# Patient Record
Sex: MALE | Race: BLACK OR AFRICAN AMERICAN | NOT HISPANIC OR LATINO | Employment: FULL TIME | URBAN - METROPOLITAN AREA
[De-identification: names, ages, dates, MRNs, and addresses within clinical notes are randomized per-mention and may not be internally consistent; named-entity substitution may affect disease eponyms.]

---

## 2017-11-29 ENCOUNTER — HOSPITAL ENCOUNTER (EMERGENCY)
Facility: HOSPITAL | Age: 32
Discharge: HOME | End: 2017-11-29
Attending: EMERGENCY MEDICINE

## 2017-11-29 VITALS
DIASTOLIC BLOOD PRESSURE: 68 MMHG | OXYGEN SATURATION: 99 % | HEIGHT: 73 IN | SYSTOLIC BLOOD PRESSURE: 134 MMHG | BODY MASS INDEX: 21.87 KG/M2 | WEIGHT: 165 LBS | HEART RATE: 95 BPM | RESPIRATION RATE: 20 BRPM | TEMPERATURE: 98 F

## 2017-11-29 DIAGNOSIS — K64.5 THROMBOSED EXTERNAL HEMORRHOIDS: Primary | ICD-10-CM

## 2017-11-29 PROCEDURE — 99283 EMERGENCY DEPT VISIT LOW MDM: CPT

## 2017-11-29 RX ORDER — HYDROCORTISONE ACETATE PRAMOXINE HCL 2.5; 1 G/100G; G/100G
CREAM TOPICAL 3 TIMES DAILY
Qty: 28.35 G | Refills: 0 | Status: SHIPPED | OUTPATIENT
Start: 2017-11-29 | End: 2019-06-18

## 2017-11-29 RX ORDER — HYDROCODONE BITARTRATE AND ACETAMINOPHEN 5; 325 MG/1; MG/1
1 TABLET ORAL EVERY 6 HOURS PRN
Qty: 12 TABLET | Refills: 0 | Status: SHIPPED | OUTPATIENT
Start: 2017-11-29 | End: 2017-12-02

## 2017-11-30 ENCOUNTER — HOSPITAL ENCOUNTER (EMERGENCY)
Facility: OTHER | Age: 32
Discharge: HOME | End: 2017-11-30
Attending: EMERGENCY MEDICINE

## 2017-11-30 VITALS
WEIGHT: 165 LBS | HEIGHT: 73 IN | OXYGEN SATURATION: 99 % | BODY MASS INDEX: 21.87 KG/M2 | TEMPERATURE: 99 F | HEART RATE: 88 BPM | RESPIRATION RATE: 18 BRPM | DIASTOLIC BLOOD PRESSURE: 84 MMHG | SYSTOLIC BLOOD PRESSURE: 129 MMHG

## 2017-11-30 DIAGNOSIS — K64.9 HEMORRHOIDS, UNSPECIFIED HEMORRHOID TYPE: Primary | ICD-10-CM

## 2017-11-30 PROCEDURE — 25000003 PHARM REV CODE 250: Performed by: EMERGENCY MEDICINE

## 2017-11-30 PROCEDURE — 99283 EMERGENCY DEPT VISIT LOW MDM: CPT

## 2017-11-30 RX ORDER — OXYCODONE AND ACETAMINOPHEN 5; 325 MG/1; MG/1
1 TABLET ORAL
Status: COMPLETED | OUTPATIENT
Start: 2017-11-30 | End: 2017-11-30

## 2017-11-30 RX ADMIN — OXYCODONE HYDROCHLORIDE AND ACETAMINOPHEN 1 TABLET: 5; 325 TABLET ORAL at 06:11

## 2017-11-30 NOTE — DISCHARGE INSTRUCTIONS
Return to the Emergency department for any worsening or failure to improve, otherwise follow up with your primary care provider.  Warm sitz baths, high fiber diet, miralax over the counter to keep stools soft.

## 2017-11-30 NOTE — ED PROVIDER NOTES
"Encounter Date: 11/30/2017          History     Chief Complaint   Patient presents with    Rectal Pain     " I have hemorrhoids real bad" x 3 days. + bright red blood, pt states he is unable to sit down in triage due to increased pain.                  Review of patient's allergies indicates:  No Known Allergies  No past medical history on file.  No past surgical history on file.  No family history on file.  Social History   Substance Use Topics    Smoking status: Current Every Day Smoker     Types: Cigarettes    Smokeless tobacco: Never Used    Alcohol use Yes      Comment: social     Review of Systems       Physical Exam     Initial Vitals [11/30/17 1722]   BP Pulse Resp Temp SpO2   130/72 90 16 98.6 °F (37 °C) 96 %      MAP       91.33         Physical Exam          ED Course   Procedures  Labs Reviewed - No data to display                            ED Course      Clinical Impression:   No diagnosis found.                          "

## 2017-11-30 NOTE — ED TRIAGE NOTES
"Pt presents with c/o hemorrhoids and states that they started last night Pt with history of hemorrhoids..  Pt c/o inability to void states that it took him "a few hours" to void.  Denies any burning or pain with urination.  Pt rates pain as 10.    "

## 2017-11-30 NOTE — ED PROVIDER NOTES
"Encounter Date: 11/29/2017       History     Chief Complaint   Patient presents with    Rectal Pain     pt states "I think my hemorrhoids came back"; pt reports pain "in between buttcheeks" and feels like something is there starting last night; pt reports hx of hemorrhoids but states that it had gone away;     Chief complaint: Rectal pain    History of present illness: Patient is a 32-year-old male who presents for emergent consideration of rectal pain is been present for 1 day.  He reports hot bath relieve the pain somewhat.  He reports a history of hemorrhoids.  Current severity is 10 over 10.      The history is provided by the patient and the spouse. No  was used.     Review of patient's allergies indicates:  No Known Allergies  History reviewed. No pertinent past medical history.  History reviewed. No pertinent surgical history.  History reviewed. No pertinent family history.  Social History   Substance Use Topics    Smoking status: Current Every Day Smoker     Types: Cigarettes    Smokeless tobacco: Never Used    Alcohol use Yes      Comment: social     Review of Systems   Constitutional: Negative for appetite change, chills, diaphoresis, fatigue and fever.   HENT: Negative for congestion, ear discharge, ear pain, postnasal drip, rhinorrhea, sinus pressure, sneezing, sore throat and voice change.    Eyes: Negative for discharge, itching and visual disturbance.   Respiratory: Negative for cough, shortness of breath and wheezing.    Cardiovascular: Negative for chest pain, palpitations and leg swelling.   Gastrointestinal: Positive for rectal pain. Negative for abdominal pain, nausea and vomiting.   Endocrine: Negative for polydipsia, polyphagia and polyuria.   Genitourinary: Negative for difficulty urinating, discharge, dysuria, frequency, hematuria, penile pain, penile swelling and urgency.   Musculoskeletal: Negative for arthralgias and myalgias.   Skin: Negative for rash and wound. "   Neurological: Negative for dizziness, seizures, syncope and weakness.   Hematological: Negative for adenopathy. Does not bruise/bleed easily.   Psychiatric/Behavioral: Negative for agitation and self-injury. The patient is not nervous/anxious.        Physical Exam     Initial Vitals [11/29/17 2122]   BP Pulse Resp Temp SpO2   134/68 95 20 98.1 °F (36.7 °C) 99 %      MAP       90         Physical Exam    Nursing note and vitals reviewed.  Constitutional: He appears well-developed and well-nourished. He is not diaphoretic. No distress.   HENT:   Head: Normocephalic and atraumatic.   Right Ear: External ear normal.   Left Ear: External ear normal.   Nose: Nose normal.   Eyes: Pupils are equal, round, and reactive to light. Right eye exhibits no discharge. Left eye exhibits no discharge. No scleral icterus.   Neck: Normal range of motion.   Pulmonary/Chest: No respiratory distress.   Abdominal: He exhibits no distension.   Genitourinary: Rectal exam shows external hemorrhoid (thrombosed) and tenderness.   Musculoskeletal: Normal range of motion.   Neurological: He is alert and oriented to person, place, and time.   Skin: Skin is dry. Capillary refill takes less than 2 seconds.         ED Course   Procedures  Labs Reviewed - No data to display          Medical Decision Making:   Initial Assessment:   Patient is a 32-year-old male who complains of one day of rectal pain that is constant he believes related to his history of hemorrhoids.    Differential Diagnosis:   Differential diagnosis includes thrombosed external hemorrhoids, prolapsed internal hemorrhoid, rectal prolapse, anal fissure.  Visual examination reveals thrombosed external hemorrhoids.  Other:   I have discussed this case with another health care provider.       <> Summary of the Discussion: Care of the patient discussed with Dr. Acosta who agreed with the assessment and plan.                       ED Course as of Nov 30 3659   Wed Nov 29, 2017   2321 BP:  134/68 [VC]   2247 Temp: 98.1 °F (36.7 °C) [VC]   2247 Pulse: 95 [VC]   2247 Resp: 20 [VC]   2247 Triage note reviewed.  VS normal.   SpO2: 99 % [VC]      ED Course User Index  [VC] Surendra Ambrocio DNP     Clinical Impression:   The encounter diagnosis was Thrombosed external hemorrhoids.    Disposition:   Disposition: Discharged  Condition: Stable  Patient discharged with instructions to practice frequent sitz baths in warm water, MiraLAX 17 g in water each day to keep stools soft, anal pain advanced cream was prescribed for pain relief.  Patient was scheduled to follow-up with [facility] today at 1:20 PM.                        Surendra Ambrocio DNP  11/30/17 0125

## 2017-11-30 NOTE — ED TRIAGE NOTES
Pt reports having an hemorrhoid. Reports being seen at [facility] and being prescribed some cream that they were unable to afford (Analpram-HC $100). Pt reports its protruding out more and is more painful. Reports he has been taking Milk of Mag to avoid constipation. Reports some blood with wiping but small amounts.

## 2017-12-01 NOTE — ED PROVIDER NOTES
"Encounter Date: 11/30/2017    SCRIBE #1 NOTE: I, Robina Ramirezos, am scribing for, and in the presence of, Dr. Mcgraw.       History     Chief Complaint   Patient presents with    Rectal Pain     " I have hemorrhoids real bad" x 3 days. + bright red blood, pt states he is unable to sit down in triage due to increased pain.      Time seen by provider: 6:09 PM    This is a 32 y.o. male who presents with complaint of rectal pain that began three days ago. He reports a history of hemorrhoids. Patient has been drinking milk and magnesium, and has taken hot baths with no relief. Patient came to the ED yesterday for similar symptoms, though did not  his prescription due to financial reasons, and missed his appointment with colorectal surgery. He reports diarrhea, but denies fever, chills, nausea, vomiting, abdominal pain, myalgias, or any urinary symptoms.      The history is provided by the patient.     Review of patient's allergies indicates:  No Known Allergies  No past medical history on file.  No past surgical history on file.  No family history on file.  Social History   Substance Use Topics    Smoking status: Current Every Day Smoker     Types: Cigarettes    Smokeless tobacco: Never Used    Alcohol use Yes      Comment: social     Review of Systems   Constitutional: Negative for chills and fever.   HENT: Negative for congestion and sore throat.    Eyes: Negative for photophobia and redness.   Respiratory: Negative for cough and shortness of breath.    Cardiovascular: Negative for chest pain.   Gastrointestinal: Positive for diarrhea and rectal pain. Negative for abdominal pain, nausea and vomiting.   Genitourinary: Negative for decreased urine volume, difficulty urinating, dysuria, enuresis, frequency, hematuria and urgency.   Musculoskeletal: Negative for back pain and myalgias.   Skin: Negative for rash.   Neurological: Negative for weakness, light-headedness and headaches.   Psychiatric/Behavioral: " Negative for confusion.       Physical Exam     Initial Vitals [11/30/17 1722]   BP Pulse Resp Temp SpO2   130/72 90 16 98.6 °F (37 °C) 96 %      MAP       91.33         Physical Exam    Nursing note and vitals reviewed.  Constitutional: He appears well-developed and well-nourished. He is not diaphoretic. No distress.   HENT:   Head: Normocephalic and atraumatic.   Mouth/Throat: Oropharynx is clear and moist.   Eyes: Conjunctivae and EOM are normal. Pupils are equal, round, and reactive to light.   Neck: Normal range of motion. Neck supple.   Cardiovascular: S1 normal and S2 normal.   Pulmonary/Chest: No respiratory distress.   Genitourinary:   Genitourinary Comments: Multiple hemorrhoids (0.5-1 cm). One appears thrombus. No evidence of cellulitis or necrosis.   Musculoskeletal: Normal range of motion. He exhibits no edema or tenderness.   No lower extremity edema.    Neurological: He is alert and oriented to person, place, and time.   Skin: Skin is warm and dry. Capillary refill takes less than 2 seconds. No rash noted. No pallor.         ED Course   Procedures  Labs Reviewed - No data to display                       Attending Attestation:           Physician Attestation for Scribe:  Physician Attestation Statement for Scribe #1: I, Dr. Soto, reviewed documentation, as scribed by Robina Helton in my presence, and it is both accurate and complete.         Attending ED Notes:   Urgent evaluation a 32-year-old male with rectal pain.  Patient is afebrile, nontoxic-appearing with stable vital signs.  Rectal examination is consistent with hemorrhoids.  The patient is extensively counseled on his diagnosis and treatment, discharged good condition and directed follow-up with rectal surgery in the next 24-48 hours.          ED Course      Clinical Impression:     1. Hemorrhoids, unspecified hemorrhoid type                                 Ayaan Soto MD  11/30/17 2132

## 2019-06-18 ENCOUNTER — HOSPITAL ENCOUNTER (EMERGENCY)
Facility: HOSPITAL | Age: 34
Discharge: HOME | End: 2019-06-18
Attending: EMERGENCY MEDICINE

## 2019-06-18 VITALS
WEIGHT: 165 LBS | TEMPERATURE: 98 F | BODY MASS INDEX: 21.87 KG/M2 | DIASTOLIC BLOOD PRESSURE: 77 MMHG | SYSTOLIC BLOOD PRESSURE: 107 MMHG | HEIGHT: 73 IN | HEART RATE: 56 BPM | OXYGEN SATURATION: 96 % | RESPIRATION RATE: 18 BRPM

## 2019-06-18 DIAGNOSIS — M54.9 BACK PAIN, UNSPECIFIED BACK LOCATION, UNSPECIFIED BACK PAIN LATERALITY, UNSPECIFIED CHRONICITY: Primary | ICD-10-CM

## 2019-06-18 PROCEDURE — 96372 THER/PROPH/DIAG INJ SC/IM: CPT

## 2019-06-18 PROCEDURE — 25000003 PHARM REV CODE 250: Performed by: PHYSICIAN ASSISTANT

## 2019-06-18 PROCEDURE — 63600175 PHARM REV CODE 636 W HCPCS: Performed by: PHYSICIAN ASSISTANT

## 2019-06-18 PROCEDURE — 99284 EMERGENCY DEPT VISIT MOD MDM: CPT | Mod: 25

## 2019-06-18 RX ORDER — METHOCARBAMOL 500 MG/1
1500 TABLET, FILM COATED ORAL
Status: COMPLETED | OUTPATIENT
Start: 2019-06-18 | End: 2019-06-18

## 2019-06-18 RX ORDER — KETOROLAC TROMETHAMINE 30 MG/ML
30 INJECTION, SOLUTION INTRAMUSCULAR; INTRAVENOUS
Status: COMPLETED | OUTPATIENT
Start: 2019-06-18 | End: 2019-06-18

## 2019-06-18 RX ORDER — METHOCARBAMOL 500 MG/1
1500 TABLET, FILM COATED ORAL 3 TIMES DAILY
Qty: 45 TABLET | Refills: 0 | Status: SHIPPED | OUTPATIENT
Start: 2019-06-18 | End: 2019-06-23

## 2019-06-18 RX ORDER — ACETAMINOPHEN 500 MG
500 TABLET ORAL EVERY 6 HOURS PRN
Status: ON HOLD | COMMUNITY
End: 2023-03-28 | Stop reason: HOSPADM

## 2019-06-18 RX ORDER — HYDROCODONE BITARTRATE AND ACETAMINOPHEN 5; 325 MG/1; MG/1
1 TABLET ORAL EVERY 4 HOURS PRN
Qty: 12 TABLET | Refills: 0 | Status: ON HOLD | OUTPATIENT
Start: 2019-06-18 | End: 2023-03-28 | Stop reason: HOSPADM

## 2019-06-18 RX ADMIN — KETOROLAC TROMETHAMINE 30 MG: 30 INJECTION, SOLUTION INTRAMUSCULAR at 11:06

## 2019-06-18 RX ADMIN — METHOCARBAMOL 1500 MG: 500 TABLET, FILM COATED ORAL at 11:06
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
## 2019-06-18 NOTE — DISCHARGE INSTRUCTIONS
Take medication As prescribed.  Place ice or heat to the areas for pain relief.  Do not drive or operate heavy machinery while taking pain medication as it could make a sleepy.  Return to the emergency department if you have any change or concerning symptoms. Please follow-up with your doctor.

## 2019-09-23 ENCOUNTER — OCCUPATIONAL HEALTH (OUTPATIENT)
Dept: URGENT CARE | Facility: CLINIC | Age: 34
End: 2019-09-23

## 2019-09-23 PROCEDURE — 82075 ASSAY OF BREATH ETHANOL: CPT | Mod: S$GLB,,, | Performed by: EMERGENCY MEDICINE

## 2019-09-23 PROCEDURE — 82075 CHG ASSAY OF BREATH ETHANOL: ICD-10-PCS | Mod: S$GLB,,, | Performed by: EMERGENCY MEDICINE

## 2019-09-23 PROCEDURE — 80305 DRUG TEST PRSMV DIR OPT OBS: CPT | Mod: S$GLB,,, | Performed by: EMERGENCY MEDICINE

## 2019-09-23 PROCEDURE — 80305 PR COLLECTION ONLY DRUG SCREEN: ICD-10-PCS | Mod: S$GLB,,, | Performed by: EMERGENCY MEDICINE

## 2021-12-31 ENCOUNTER — HOSPITAL ENCOUNTER (EMERGENCY)
Facility: HOSPITAL | Age: 36
Discharge: HOME | End: 2021-12-31
Attending: EMERGENCY MEDICINE
Payer: OTHER GOVERNMENT

## 2021-12-31 VITALS
WEIGHT: 170 LBS | OXYGEN SATURATION: 99 % | DIASTOLIC BLOOD PRESSURE: 76 MMHG | RESPIRATION RATE: 16 BRPM | TEMPERATURE: 100 F | SYSTOLIC BLOOD PRESSURE: 121 MMHG | BODY MASS INDEX: 22.53 KG/M2 | HEIGHT: 73 IN | HEART RATE: 79 BPM

## 2021-12-31 DIAGNOSIS — U07.1 COVID-19: Primary | ICD-10-CM

## 2021-12-31 LAB — SARS-COV-2 RDRP RESP QL NAA+PROBE: POSITIVE

## 2021-12-31 PROCEDURE — U0002 COVID-19 LAB TEST NON-CDC: HCPCS | Performed by: EMERGENCY MEDICINE

## 2021-12-31 PROCEDURE — 99283 EMERGENCY DEPT VISIT LOW MDM: CPT

## 2021-12-31 PROCEDURE — 25000003 PHARM REV CODE 250: Performed by: EMERGENCY MEDICINE

## 2021-12-31 RX ORDER — ACETAMINOPHEN 500 MG
1000 TABLET ORAL
Status: COMPLETED | OUTPATIENT
Start: 2021-12-31 | End: 2021-12-31

## 2021-12-31 RX ADMIN — ACETAMINOPHEN 1000 MG: 500 TABLET, FILM COATED ORAL at 02:12

## 2021-12-31 NOTE — ED PROVIDER NOTES
Encounter Date: 2021       History     Chief Complaint   Patient presents with    COVID-19 Concerns     He also has a headache  for the past two days     Chief complaint mild throbbing headache and the sensation that he has fever.  He is concerned they have COVID was see test.  He has no complaints of chest pain nausea vomiting diarrhea trouble urinating or any other problems.        Review of patient's allergies indicates:  No Known Allergies  No past medical history on file.  No past surgical history on file.  No family history on file.  Social History     Tobacco Use    Smoking status: Former Smoker     Types: Cigarettes     Quit date: 2019     Years since quittin.7    Smokeless tobacco: Never Used   Substance Use Topics    Alcohol use: Yes     Comment: socially    Drug use: No     Review of Systems   Constitutional: Positive for fever. Negative for chills.   HENT: Negative for ear pain, rhinorrhea and sore throat.    Eyes: Negative for pain and visual disturbance.   Respiratory: Negative for cough and shortness of breath.    Cardiovascular: Negative for chest pain and palpitations.   Gastrointestinal: Negative for abdominal pain, constipation, diarrhea, nausea and vomiting.   Genitourinary: Negative for dysuria, frequency, hematuria and urgency.   Musculoskeletal: Negative for back pain, joint swelling and myalgias.   Skin: Negative for rash.   Neurological: Positive for headaches. Negative for dizziness, seizures and weakness.   Psychiatric/Behavioral: Negative for dysphoric mood. The patient is not nervous/anxious.        Physical Exam     Initial Vitals [21 0242]   BP Pulse Resp Temp SpO2   121/76 79 16 99.9 °F (37.7 °C) 99 %      MAP       --         Physical Exam    Nursing note and vitals reviewed.  Constitutional: He appears well-developed and well-nourished.   HENT:   Head: Normocephalic and atraumatic.   Eyes: Conjunctivae, EOM and lids are normal. Pupils are equal, round, and  reactive to light.   Neck: Trachea normal. Neck supple. No thyroid mass present.   Cardiovascular: Normal rate, regular rhythm and normal heart sounds.   Pulmonary/Chest: Breath sounds normal. No respiratory distress.   Abdominal: Abdomen is soft. There is no abdominal tenderness.   Musculoskeletal:         General: Normal range of motion.      Cervical back: Neck supple.     Neurological: He is alert and oriented to person, place, and time. He has normal strength and normal reflexes. No cranial nerve deficit or sensory deficit.   Skin: Skin is warm and dry.   Psychiatric: He has a normal mood and affect. His speech is normal and behavior is normal. Judgment and thought content normal.         ED Course   Procedures  Labs Reviewed   SARS-COV-2 RNA AMPLIFICATION, QUAL - Abnormal; Notable for the following components:       Result Value    SARS-CoV-2 RNA, Amplification, Qual Positive (*)     All other components within normal limits          Imaging Results    None          Medications   acetaminophen tablet 1,000 mg (1,000 mg Oral Given 12/31/21 0250)     Medical Decision Making:   ED Management:  The patient has positive COVID will give instructions                      Clinical Impression:   Final diagnoses:  [U07.1] COVID-19 (Primary)          ED Disposition Condition    Discharge Stable        ED Prescriptions     None        Follow-up Information    None          Shanice Madrigal MD  12/31/21 9022

## 2021-12-31 NOTE — DISCHARGE INSTRUCTIONS
Buy a pulse ox device and if your oxygen is 90% or less come to the ER for evaluation.  Drink plenty fluids take your vitamins is zinc return as needed

## 2022-12-08 ENCOUNTER — HOSPITAL ENCOUNTER (EMERGENCY)
Facility: HOSPITAL | Age: 37
Discharge: HOME | End: 2022-12-08
Attending: EMERGENCY MEDICINE
Payer: COMMERCIAL

## 2022-12-08 VITALS
BODY MASS INDEX: 21.87 KG/M2 | SYSTOLIC BLOOD PRESSURE: 110 MMHG | DIASTOLIC BLOOD PRESSURE: 70 MMHG | WEIGHT: 165 LBS | HEART RATE: 78 BPM | HEIGHT: 73 IN | RESPIRATION RATE: 18 BRPM | TEMPERATURE: 98 F | OXYGEN SATURATION: 99 %

## 2022-12-08 DIAGNOSIS — M62.838 MUSCLE SPASM: Primary | ICD-10-CM

## 2022-12-08 PROCEDURE — 99283 EMERGENCY DEPT VISIT LOW MDM: CPT

## 2022-12-08 PROCEDURE — 25000003 PHARM REV CODE 250: Performed by: STUDENT IN AN ORGANIZED HEALTH CARE EDUCATION/TRAINING PROGRAM

## 2022-12-08 RX ORDER — LIDOCAINE 50 MG/G
1 PATCH TOPICAL ONCE
Status: DISCONTINUED | OUTPATIENT
Start: 2022-12-08 | End: 2022-12-08 | Stop reason: HOSPADM

## 2022-12-08 RX ORDER — LIDOCAINE 560 MG/1
1 PATCH PERCUTANEOUS; TOPICAL; TRANSDERMAL EVERY 12 HOURS
Qty: 10 PATCH | Refills: 0 | Status: SHIPPED | OUTPATIENT
Start: 2022-12-08 | End: 2022-12-08 | Stop reason: SDUPTHER

## 2022-12-08 RX ORDER — BUTALBITAL, ACETAMINOPHEN AND CAFFEINE 50; 325; 40 MG/1; MG/1; MG/1
1 TABLET ORAL ONCE
Status: DISCONTINUED | OUTPATIENT
Start: 2022-12-08 | End: 2022-12-08

## 2022-12-08 RX ORDER — METHOCARBAMOL 500 MG/1
1000 TABLET, FILM COATED ORAL 3 TIMES DAILY
Qty: 30 TABLET | Refills: 0 | Status: SHIPPED | OUTPATIENT
Start: 2022-12-08 | End: 2022-12-13

## 2022-12-08 RX ORDER — ACETAMINOPHEN 500 MG
1000 TABLET ORAL
Status: COMPLETED | OUTPATIENT
Start: 2022-12-08 | End: 2022-12-08

## 2022-12-08 RX ORDER — BUTALBITAL, ACETAMINOPHEN AND CAFFEINE 50; 325; 40 MG/1; MG/1; MG/1
1 TABLET ORAL EVERY 4 HOURS PRN
Status: DISCONTINUED | OUTPATIENT
Start: 2022-12-08 | End: 2022-12-08

## 2022-12-08 RX ORDER — LIDOCAINE 560 MG/1
1 PATCH PERCUTANEOUS; TOPICAL; TRANSDERMAL EVERY 12 HOURS
Qty: 10 PATCH | Refills: 0 | Status: ON HOLD | OUTPATIENT
Start: 2022-12-08 | End: 2023-03-28 | Stop reason: HOSPADM

## 2022-12-08 RX ORDER — METHOCARBAMOL 500 MG/1
500 TABLET, FILM COATED ORAL
Status: COMPLETED | OUTPATIENT
Start: 2022-12-08 | End: 2022-12-08

## 2022-12-08 RX ORDER — NAPROXEN 500 MG/1
500 TABLET ORAL 2 TIMES DAILY WITH MEALS
Qty: 14 TABLET | Refills: 0 | Status: ON HOLD | OUTPATIENT
Start: 2022-12-08 | End: 2023-03-28 | Stop reason: HOSPADM

## 2022-12-08 RX ADMIN — LIDOCAINE 1 PATCH: 50 PATCH TOPICAL at 06:12

## 2022-12-08 RX ADMIN — ACETAMINOPHEN 1000 MG: 500 TABLET ORAL at 06:12

## 2022-12-08 RX ADMIN — METHOCARBAMOL 500 MG: 500 TABLET ORAL at 06:12

## 2022-12-08 NOTE — ED NOTES
Patient identifiers for Brennan Shah checked and correct.  LOC:  Brennan Shah is awake, alert, and aware of environment with an appropriate affect. He is oriented x 3 and speaking appropriately.  APPEARANCE:  He is resting comfortably and in no acute distress. He is clean and well groomed, patient's clothing is properly fastened.  SKIN:  The skin is warm and dry. He has normal skin turgor and moist mucus membranes. Skin is intact; no bruising or breakdown noted.  MUSCULOSKELETAL:  He is moving all extremities well, no obvious deformities noted. Pulses intact.   RESPIRATORY:  Airway is open and patent. Respirations are spontaneous and non-labored with normal effort and rate.  CARDIAC:  He has a normal rate and rhythm. No peripheral edema noted. Capillary refill < 3 seconds.  ABDOMEN:  No distention noted.  Soft and non-tender upon palpation.  NEUROLOGICAL:  PERRL. Facial expression is symmetrical. Hand grasps are equal bilaterally. Normal sensation in all extremities when touched with finger.  Allergies reported:  Review of patient's allergies indicates:  No Known Allergies  OTHER NOTES:

## 2022-12-08 NOTE — ED PROVIDER NOTES
Encounter Date: 12/8/2022       History     Chief Complaint   Patient presents with    Headache    Shoulder Pain     Pain radiating down Rt shoulder/arm  Denies trauma     HPI    36 yo M with no sig pmh presenting with headache and R neck and shoulder pain for 2 days. Pt denies inciting injury. 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 Endorses pain with movement. The shoulder and arm pain started first, and radiated up to the R forehead. Endorses 9/10 headache. He tried a massage machine yesterday, heat packs, topical anesthetics. He tried aleve 1 hr PTA. No weakness, numbness, no neck stiffness.      Review of patient's allergies indicates:  No Known Allergies  No past medical history on file.  No past surgical history on file.  No family history on file.  Social History     Tobacco Use    Smoking status: Former     Types: Cigarettes     Quit date: 04/2019     Years since quitting: 3.6    Smokeless tobacco: Never   Substance Use Topics    Alcohol use: Yes     Comment: socially    Drug use: No     Review of Systems   Constitutional:  Negative for fatigue and fever.   HENT:  Negative for congestion.    Eyes:  Negative for redness.   Respiratory:  Negative for cough and shortness of breath.    Cardiovascular:  Negative for chest pain.   Gastrointestinal:  Negative for nausea and vomiting.   Endocrine: Negative for polyuria.   Genitourinary:  Negative for difficulty urinating and dysuria.   Musculoskeletal:  Positive for myalgias and neck pain. Negative for back pain and neck stiffness.   Skin:  Negative for rash and wound.   Allergic/Immunologic: Negative for immunocompromised state.   Neurological:  Positive for headaches. Negative for weakness.   Hematological:  Does not bruise/bleed easily.     Physical Exam     Initial Vitals [12/08/22 0454]   BP Pulse Resp Temp SpO2   (!) 105/58 (!) 59 15 98.4 °F (36.9 °C) 97 %      MAP       --         Physical Exam    Nursing note and vitals  reviewed.  Constitutional: He appears well-developed and well-nourished.   HENT:   Head: Normocephalic and atraumatic.   Eyes: EOM are normal. Pupils are equal, round, and reactive to light.   Neck:   Normal range of motion.  Cardiovascular:  Normal rate and regular rhythm.           Pulmonary/Chest: Breath sounds normal.   Abdominal: Abdomen is soft. Bowel sounds are normal.   Musculoskeletal:         General: Normal range of motion.      Cervical back: Normal range of motion.     Neurological: He is alert.   CN II-XII intact, clear speech, strength 5/5 in UE and LE, normal sensation to UE and LE, no nystagmus, pupils 4 mm and PERRL, negative FTN, and negative pronator drift.   Skin: Skin is warm. Capillary refill takes less than 2 seconds.   Psychiatric: He has a normal mood and affect.       ED Course   Procedures  Labs Reviewed - No data to display       Imaging Results    None          Medications   LIDOcaine 5 % patch 1 patch (1 patch Transdermal Patch Applied 12/8/22 0617)   methocarbamoL tablet 500 mg (500 mg Oral Given 12/8/22 0616)   acetaminophen tablet 1,000 mg (1,000 mg Oral Given 12/8/22 0623)                       38 yo M with no sig pmh presenting with headache and R neck and shoulder pain for 2 days. Ddx includes torticollis, muscle spasm, trigeminal neuralgia, tension headache, muscle strain, radiculopathy. VS grossly normal. Denies red flag sx. No imaging needed. Ordered lidocaine patch, robaxin and fioricet for headache. If pain improves, pt will be clear for discharge for continued rx for robaxin and lidocaine patches.     Gabby Sullivan MD PGY5  [facility]  6:10 AM 12/08/2022               Clinical Impression:   Final diagnoses:  [M62.838] Muscle spasm (Primary)      ED Disposition Condition    Discharge Stable          ED Prescriptions       Medication Sig Dispense Start Date End Date Auth. Provider    methocarbamoL (ROBAXIN) 500 MG Tab Take 2 tablets (1,000 mg total)  by mouth 3 (three) times daily. for 5 days 30 tablet 12/8/2022 12/13/2022 Gabby Millan MD    LIDOcaine 4 % PtMd Apply 1 patch topically every 12 (twelve) hours. 10 patch 12/8/2022 -- Gabby Millan MD    naproxen (NAPROSYN) 500 MG tablet Take 1 tablet (500 mg total) by mouth 2 (two) times daily with meals. 14 tablet 12/8/2022 -- Gabby Millan MD          Follow-up Information       Follow up With Specialties Details Why Contact Info Additional Information    [facility] - Emergency Dept Emergency Medicine Go to  If symptoms worsen [address] [facility] [address]  [phone] [address] [place] [address]  Schedule an appointment as soon as possible for a visit in 3 days For ED follow up [address]  [facility] [address]  [phone]                Gabby Millan MD  Resident  12/08/22 0624

## 2022-12-08 NOTE — Clinical Note
"Brennan Shah (Byron) was seen and treated in our emergency department on 12/8/2022.  He may return to work on 12/09/2022.       If you have any questions or concerns, please don't hesitate to call.      Gabby Millan MD"

## 2023-03-24 ENCOUNTER — HOSPITAL ENCOUNTER (INPATIENT)
Facility: HOSPITAL | Age: 38
LOS: 4 days | Discharge: HOME | DRG: 060 | End: 2023-03-28
Attending: EMERGENCY MEDICINE | Admitting: HOSPITALIST
Payer: COMMERCIAL

## 2023-03-24 DIAGNOSIS — I63.9 STROKE: ICD-10-CM

## 2023-03-24 DIAGNOSIS — I63.81 RIGHT-SIDED LACUNAR INFARCTION: ICD-10-CM

## 2023-03-24 DIAGNOSIS — I63.9 CVA (CEREBRAL VASCULAR ACCIDENT): ICD-10-CM

## 2023-03-24 DIAGNOSIS — R53.1 ACUTE LEFT-SIDED WEAKNESS: ICD-10-CM

## 2023-03-24 DIAGNOSIS — G35 MULTIPLE SCLEROSIS WITH ACUTE NEUROLOGIC EVENT: Primary | ICD-10-CM

## 2023-03-24 PROBLEM — F12.90 MARIJUANA SMOKER: Status: ACTIVE | Noted: 2023-03-24

## 2023-03-24 PROBLEM — G81.90 HEMIPARESIS: Status: ACTIVE | Noted: 2023-03-24

## 2023-03-24 LAB
ALBUMIN SERPL BCP-MCNC: 4.5 G/DL (ref 3.5–5.2)
ALP SERPL-CCNC: 50 U/L (ref 55–135)
ALT SERPL W/O P-5'-P-CCNC: 13 U/L (ref 10–44)
ANION GAP SERPL CALC-SCNC: 8 MMOL/L (ref 8–16)
AST SERPL-CCNC: 19 U/L (ref 10–40)
BASOPHILS # BLD AUTO: 0.03 K/UL (ref 0–0.2)
BASOPHILS NFR BLD: 0.5 % (ref 0–1.9)
BILIRUB SERPL-MCNC: 0.6 MG/DL (ref 0.1–1)
BUN SERPL-MCNC: 9 MG/DL (ref 6–20)
CALCIUM SERPL-MCNC: 9.5 MG/DL (ref 8.7–10.5)
CHLORIDE SERPL-SCNC: 103 MMOL/L (ref 95–110)
CHOLEST SERPL-MCNC: 184 MG/DL (ref 120–199)
CHOLEST/HDLC SERPL: 3.3 {RATIO} (ref 2–5)
CO2 SERPL-SCNC: 29 MMOL/L (ref 23–29)
CREAT SERPL-MCNC: 1.1 MG/DL (ref 0.5–1.4)
DIFFERENTIAL METHOD: NORMAL
EOSINOPHIL # BLD AUTO: 0.3 K/UL (ref 0–0.5)
EOSINOPHIL NFR BLD: 4.1 % (ref 0–8)
ERYTHROCYTE [DISTWIDTH] IN BLOOD BY AUTOMATED COUNT: 12.7 % (ref 11.5–14.5)
EST. GFR  (NO RACE VARIABLE): >60 ML/MIN/1.73 M^2
GLUCOSE SERPL-MCNC: 101 MG/DL (ref 70–110)
HCT VFR BLD AUTO: 47.1 % (ref 40–54)
HDLC SERPL-MCNC: 55 MG/DL (ref 40–75)
HDLC SERPL: 29.9 % (ref 20–50)
HGB BLD-MCNC: 15.2 G/DL (ref 14–18)
IMM GRANULOCYTES # BLD AUTO: 0.02 K/UL (ref 0–0.04)
IMM GRANULOCYTES NFR BLD AUTO: 0.3 % (ref 0–0.5)
INR PPP: 1.1 (ref 0.8–1.2)
LDLC SERPL CALC-MCNC: 103.2 MG/DL (ref 63–159)
LYMPHOCYTES # BLD AUTO: 1.9 K/UL (ref 1–4.8)
LYMPHOCYTES NFR BLD: 28.2 % (ref 18–48)
MCH RBC QN AUTO: 30.2 PG (ref 27–31)
MCHC RBC AUTO-ENTMCNC: 32.3 G/DL (ref 32–36)
MCV RBC AUTO: 94 FL (ref 82–98)
MONOCYTES # BLD AUTO: 0.4 K/UL (ref 0.3–1)
MONOCYTES NFR BLD: 6.3 % (ref 4–15)
NEUTROPHILS # BLD AUTO: 4 K/UL (ref 1.8–7.7)
NEUTROPHILS NFR BLD: 60.6 % (ref 38–73)
NONHDLC SERPL-MCNC: 129 MG/DL
NRBC BLD-RTO: 0 /100 WBC
PLATELET # BLD AUTO: 199 K/UL (ref 150–450)
PMV BLD AUTO: 10.6 FL (ref 9.2–12.9)
POTASSIUM SERPL-SCNC: 4 MMOL/L (ref 3.5–5.1)
PROT SERPL-MCNC: 7.1 G/DL (ref 6–8.4)
PROTHROMBIN TIME: 11.9 SEC (ref 9–12.5)
RBC # BLD AUTO: 5.03 M/UL (ref 4.6–6.2)
SODIUM SERPL-SCNC: 140 MMOL/L (ref 136–145)
TRIGL SERPL-MCNC: 129 MG/DL (ref 30–150)
TSH SERPL DL<=0.005 MIU/L-ACNC: 0.78 UIU/ML (ref 0.34–5.6)
WBC # BLD AUTO: 6.64 K/UL (ref 3.9–12.7)

## 2023-03-24 PROCEDURE — 84443 ASSAY THYROID STIM HORMONE: CPT

## 2023-03-24 PROCEDURE — 93010 EKG 12-LEAD: ICD-10-PCS | Mod: ,,, | Performed by: SPECIALIST

## 2023-03-24 PROCEDURE — 85025 COMPLETE CBC W/AUTO DIFF WBC: CPT

## 2023-03-24 PROCEDURE — 80307 DRUG TEST PRSMV CHEM ANLYZR: CPT | Performed by: HOSPITALIST

## 2023-03-24 PROCEDURE — 80061 LIPID PANEL: CPT

## 2023-03-24 PROCEDURE — 81003 URINALYSIS AUTO W/O SCOPE: CPT | Performed by: HOSPITALIST

## 2023-03-24 PROCEDURE — 80053 COMPREHEN METABOLIC PANEL: CPT

## 2023-03-24 PROCEDURE — 99285 EMERGENCY DEPT VISIT HI MDM: CPT | Mod: 25

## 2023-03-24 PROCEDURE — 21400001 HC TELEMETRY ROOM

## 2023-03-24 PROCEDURE — 93005 ELECTROCARDIOGRAM TRACING: CPT | Performed by: SPECIALIST

## 2023-03-24 PROCEDURE — 85610 PROTHROMBIN TIME: CPT

## 2023-03-24 PROCEDURE — 93010 ELECTROCARDIOGRAM REPORT: CPT | Mod: ,,, | Performed by: SPECIALIST

## 2023-03-24 RX ORDER — SODIUM CHLORIDE 0.9 % (FLUSH) 0.9 %
10 SYRINGE (ML) INJECTION
Status: DISCONTINUED | OUTPATIENT
Start: 2023-03-25 | End: 2023-03-28 | Stop reason: HOSPADM

## 2023-03-24 RX ORDER — ASPIRIN 325 MG
325 TABLET ORAL
Status: COMPLETED | OUTPATIENT
Start: 2023-03-24 | End: 2023-03-25

## 2023-03-24 RX ORDER — ATORVASTATIN CALCIUM 40 MG/1
40 TABLET, FILM COATED ORAL NIGHTLY
Status: DISCONTINUED | OUTPATIENT
Start: 2023-03-25 | End: 2023-03-28 | Stop reason: HOSPADM

## 2023-03-24 RX ORDER — ASPIRIN 81 MG/1
81 TABLET ORAL DAILY
Status: DISCONTINUED | OUTPATIENT
Start: 2023-03-25 | End: 2023-03-27

## 2023-03-24 RX ORDER — LABETALOL HYDROCHLORIDE 5 MG/ML
10 INJECTION, SOLUTION INTRAVENOUS
Status: DISCONTINUED | OUTPATIENT
Start: 2023-03-25 | End: 2023-03-28 | Stop reason: HOSPADM

## 2023-03-24 RX ORDER — ONDANSETRON 2 MG/ML
4 INJECTION INTRAMUSCULAR; INTRAVENOUS EVERY 8 HOURS PRN
Status: DISCONTINUED | OUTPATIENT
Start: 2023-03-25 | End: 2023-03-28 | Stop reason: HOSPADM

## 2023-03-25 ENCOUNTER — CLINICAL SUPPORT (OUTPATIENT)
Dept: CARDIOLOGY | Facility: HOSPITAL | Age: 38
DRG: 060 | End: 2023-03-25
Attending: HOSPITALIST
Payer: COMMERCIAL

## 2023-03-25 VITALS — WEIGHT: 158 LBS | BODY MASS INDEX: 20.94 KG/M2 | HEIGHT: 73 IN

## 2023-03-25 LAB
ALBUMIN SERPL BCP-MCNC: 4 G/DL (ref 3.5–5.2)
ALP SERPL-CCNC: 44 U/L (ref 55–135)
ALT SERPL W/O P-5'-P-CCNC: 15 U/L (ref 10–44)
AMPHET+METHAMPHET UR QL: NEGATIVE
ANION GAP SERPL CALC-SCNC: 6 MMOL/L (ref 8–16)
AORTIC ROOT ANNULUS: 2.7 CM
AORTIC VALVE CUSP SEPERATION: 1.7 CM
AST SERPL-CCNC: 19 U/L (ref 10–40)
AV INDEX (PROSTH): 0.82
AV MEAN GRADIENT: 3 MMHG
AV PEAK GRADIENT: 6 MMHG
AV VALVE AREA: 2.59 CM2
AV VELOCITY RATIO: 0.85
BARBITURATES UR QL SCN>200 NG/ML: NEGATIVE
BASOPHILS # BLD AUTO: 0.02 K/UL (ref 0–0.2)
BASOPHILS NFR BLD: 0.3 % (ref 0–1.9)
BENZODIAZ UR QL SCN>200 NG/ML: NEGATIVE
BILIRUB SERPL-MCNC: 0.5 MG/DL (ref 0.1–1)
BILIRUB UR QL STRIP: NEGATIVE
BSA FOR ECHO PROCEDURE: 1.92 M2
BUN SERPL-MCNC: 12 MG/DL (ref 6–20)
BZE UR QL SCN: NEGATIVE
CALCIUM SERPL-MCNC: 9.1 MG/DL (ref 8.7–10.5)
CANNABINOIDS UR QL SCN: ABNORMAL
CHLORIDE SERPL-SCNC: 105 MMOL/L (ref 95–110)
CK MB SERPL-MCNC: 0.7 NG/ML (ref 0.1–6.5)
CLARITY UR: CLEAR
CO2 SERPL-SCNC: 26 MMOL/L (ref 23–29)
COLOR UR: YELLOW
CREAT SERPL-MCNC: 0.9 MG/DL (ref 0.5–1.4)
CREAT UR-MCNC: 240 MG/DL (ref 23–375)
CV ECHO LV RWT: 0.57 CM
DIFFERENTIAL METHOD: NORMAL
DOP CALC AO PEAK VEL: 1.18 M/S
DOP CALC AO VTI: 24.5 CM
DOP CALC LVOT AREA: 3.1 CM2
DOP CALC LVOT DIAMETER: 2 CM
DOP CALC LVOT PEAK VEL: 1 M/S
DOP CALC LVOT STROKE VOLUME: 63.43 CM3
DOP CALC MV VTI: 38.2 CM
DOP CALCLVOT PEAK VEL VTI: 20.2 CM
E WAVE DECELERATION TIME: 250 MSEC
E/A RATIO: 1.26
E/E' RATIO: 4.53 M/S
ECHO LV POSTERIOR WALL: 1.22 CM (ref 0.6–1.1)
EJECTION FRACTION: 69 %
EOSINOPHIL # BLD AUTO: 0.3 K/UL (ref 0–0.5)
EOSINOPHIL NFR BLD: 4.8 % (ref 0–8)
ERYTHROCYTE [DISTWIDTH] IN BLOOD BY AUTOMATED COUNT: 12.4 % (ref 11.5–14.5)
EST. GFR  (NO RACE VARIABLE): >60 ML/MIN/1.73 M^2
ESTIMATED AVG GLUCOSE: 120 MG/DL (ref 68–131)
FRACTIONAL SHORTENING: 39 % (ref 28–44)
GLUCOSE SERPL-MCNC: 108 MG/DL (ref 70–110)
GLUCOSE UR QL STRIP: NEGATIVE
HBA1C MFR BLD: 5.8 % (ref 4.5–6.2)
HCT VFR BLD AUTO: 44.6 % (ref 40–54)
HGB BLD-MCNC: 14.8 G/DL (ref 14–18)
HGB UR QL STRIP: NEGATIVE
IMM GRANULOCYTES # BLD AUTO: 0.02 K/UL (ref 0–0.04)
IMM GRANULOCYTES NFR BLD AUTO: 0.3 % (ref 0–0.5)
INTERVENTRICULAR SEPTUM: 0.82 CM (ref 0.6–1.1)
KETONES UR QL STRIP: NEGATIVE
LEFT INTERNAL DIMENSION IN SYSTOLE: 2.6 CM (ref 2.1–4)
LEFT VENTRICLE DIASTOLIC VOLUME INDEX: 41.9 ML/M2
LEFT VENTRICLE DIASTOLIC VOLUME: 81.7 ML
LEFT VENTRICLE MASS INDEX: 74 G/M2
LEFT VENTRICLE SYSTOLIC VOLUME INDEX: 12.6 ML/M2
LEFT VENTRICLE SYSTOLIC VOLUME: 24.6 ML
LEFT VENTRICULAR INTERNAL DIMENSION IN DIASTOLE: 4.27 CM (ref 3.5–6)
LEFT VENTRICULAR MASS: 144.86 G
LEUKOCYTE ESTERASE UR QL STRIP: NEGATIVE
LV LATERAL E/E' RATIO: 4.25 M/S
LV SEPTAL E/E' RATIO: 4.86 M/S
LVOT MG: 2 MMHG
LVOT MV: 0.64 CM/S
LYMPHOCYTES # BLD AUTO: 2 K/UL (ref 1–4.8)
LYMPHOCYTES NFR BLD: 28.2 % (ref 18–48)
MAGNESIUM SERPL-MCNC: 1.8 MG/DL (ref 1.6–2.6)
MCH RBC QN AUTO: 30.9 PG (ref 27–31)
MCHC RBC AUTO-ENTMCNC: 33.2 G/DL (ref 32–36)
MCV RBC AUTO: 93 FL (ref 82–98)
MONOCYTES # BLD AUTO: 0.4 K/UL (ref 0.3–1)
MONOCYTES NFR BLD: 5.8 % (ref 4–15)
MV MEAN GRADIENT: 2 MMHG
MV PEAK A VEL: 0.54 M/S
MV PEAK E VEL: 0.68 M/S
MV PEAK GRADIENT: 4 MMHG
MV STENOSIS PRESSURE HALF TIME: 141 MS
MV VALVE AREA BY CONTINUITY EQUATION: 1.66 CM2
MV VALVE AREA P 1/2 METHOD: 1.56 CM2
NEUTROPHILS # BLD AUTO: 4.3 K/UL (ref 1.8–7.7)
NEUTROPHILS NFR BLD: 60.6 % (ref 38–73)
NITRITE UR QL STRIP: NEGATIVE
NRBC BLD-RTO: 0 /100 WBC
OPIATES UR QL SCN: NEGATIVE
PCP UR QL SCN>25 NG/ML: NEGATIVE
PH UR STRIP: 8 [PH] (ref 5–8)
PHOSPHATE SERPL-MCNC: 3.8 MG/DL (ref 2.7–4.5)
PLATELET # BLD AUTO: 186 K/UL (ref 150–450)
PMV BLD AUTO: 10.8 FL (ref 9.2–12.9)
POTASSIUM SERPL-SCNC: 3.9 MMOL/L (ref 3.5–5.1)
PROT SERPL-MCNC: 6.4 G/DL (ref 6–8.4)
PROT UR QL STRIP: NEGATIVE
PV MV: 0.78 M/S
PV PEAK VELOCITY: 1.15 CM/S
RA PRESSURE: 3 MMHG
RBC # BLD AUTO: 4.79 M/UL (ref 4.6–6.2)
SINUS: 2.93 CM
SODIUM SERPL-SCNC: 137 MMOL/L (ref 136–145)
SP GR UR STRIP: 1.02 (ref 1–1.03)
STJ: 2.65 CM
TDI LATERAL: 0.16 M/S
TDI SEPTAL: 0.14 M/S
TDI: 0.15 M/S
TOXICOLOGY INFORMATION: ABNORMAL
TRICUSPID ANNULAR PLANE SYSTOLIC EXCURSION: 2.67 CM
TROPONIN I SERPL HS-MCNC: <2.3 PG/ML (ref 0–14.9)
URN SPEC COLLECT METH UR: ABNORMAL
UROBILINOGEN UR STRIP-ACNC: ABNORMAL EU/DL
WBC # BLD AUTO: 7.12 K/UL (ref 3.9–12.7)

## 2023-03-25 PROCEDURE — 21400001 HC TELEMETRY ROOM

## 2023-03-25 PROCEDURE — 36415 COLL VENOUS BLD VENIPUNCTURE: CPT | Performed by: HOSPITALIST

## 2023-03-25 PROCEDURE — 80053 COMPREHEN METABOLIC PANEL: CPT | Performed by: HOSPITALIST

## 2023-03-25 PROCEDURE — 84484 ASSAY OF TROPONIN QUANT: CPT | Performed by: HOSPITALIST

## 2023-03-25 PROCEDURE — 25000003 PHARM REV CODE 250: Performed by: EMERGENCY MEDICINE

## 2023-03-25 PROCEDURE — 92610 EVALUATE SWALLOWING FUNCTION: CPT

## 2023-03-25 PROCEDURE — 83735 ASSAY OF MAGNESIUM: CPT | Performed by: HOSPITALIST

## 2023-03-25 PROCEDURE — 97165 OT EVAL LOW COMPLEX 30 MIN: CPT

## 2023-03-25 PROCEDURE — 82553 CREATINE MB FRACTION: CPT | Performed by: HOSPITALIST

## 2023-03-25 PROCEDURE — 25000003 PHARM REV CODE 250: Performed by: HOSPITALIST

## 2023-03-25 PROCEDURE — 83036 HEMOGLOBIN GLYCOSYLATED A1C: CPT | Performed by: HOSPITALIST

## 2023-03-25 PROCEDURE — 93306 ECHO (CUPID ONLY): ICD-10-PCS | Mod: 26,,, | Performed by: SPECIALIST

## 2023-03-25 PROCEDURE — 93306 TTE W/DOPPLER COMPLETE: CPT

## 2023-03-25 PROCEDURE — 84100 ASSAY OF PHOSPHORUS: CPT | Performed by: HOSPITALIST

## 2023-03-25 PROCEDURE — A9585 GADOBUTROL INJECTION: HCPCS | Performed by: STUDENT IN AN ORGANIZED HEALTH CARE EDUCATION/TRAINING PROGRAM

## 2023-03-25 PROCEDURE — 97161 PT EVAL LOW COMPLEX 20 MIN: CPT

## 2023-03-25 PROCEDURE — 25500020 PHARM REV CODE 255: Performed by: STUDENT IN AN ORGANIZED HEALTH CARE EDUCATION/TRAINING PROGRAM

## 2023-03-25 PROCEDURE — 93306 TTE W/DOPPLER COMPLETE: CPT | Mod: 26,,, | Performed by: SPECIALIST

## 2023-03-25 PROCEDURE — 97535 SELF CARE MNGMENT TRAINING: CPT

## 2023-03-25 PROCEDURE — 85025 COMPLETE CBC W/AUTO DIFF WBC: CPT | Performed by: HOSPITALIST

## 2023-03-25 RX ORDER — GADOBUTROL 604.72 MG/ML
7 INJECTION INTRAVENOUS
Status: COMPLETED | OUTPATIENT
Start: 2023-03-25 | End: 2023-03-25

## 2023-03-25 RX ADMIN — ASPIRIN 81 MG: 81 TABLET, COATED ORAL at 11:03

## 2023-03-25 RX ADMIN — GADOBUTROL 7 ML: 604.72 INJECTION INTRAVENOUS at 09:03

## 2023-03-25 RX ADMIN — ATORVASTATIN CALCIUM 40 MG: 40 TABLET, FILM COATED ORAL at 09:03

## 2023-03-25 RX ADMIN — ASPIRIN 325 MG ORAL TABLET 325 MG: 325 PILL ORAL at 12:03

## 2023-03-25 NOTE — SUBJECTIVE & OBJECTIVE
Interval History:  Admitted overnight with acute left-sided weakness.  Found to have basal ganglia CVA.  The rest of the workup is still in process.  Neurology consult is pending.    Review of Systems   All other systems reviewed and are negative.  Objective:     Vital Signs (Most Recent):  Temp: 98.4 °F (36.9 °C) (03/25/23 1131)  Pulse: 63 (03/25/23 1131)  Resp: 18 (03/25/23 1131)  BP: (!) 94/54 (03/25/23 1131)  SpO2: 99 % (03/25/23 1131)   Vital Signs (24h Range):  Temp:  [97.8 °F (36.6 °C)-98.4 °F (36.9 °C)] 98.4 °F (36.9 °C)  Pulse:  [63-95] 63  Resp:  [15-18] 18  SpO2:  [98 %-100 %] 99 %  BP: ()/(54-77) 94/54     Weight: 71.7 kg (158 lb 1.6 oz)  Body mass index is 20.86 kg/m².    Intake/Output Summary (Last 24 hours) at 3/25/2023 1227  Last data filed at 3/25/2023 1011  Gross per 24 hour   Intake 240 ml   Output 250 ml   Net -10 ml      Physical Exam  Vitals reviewed.   Constitutional:       Appearance: Normal appearance.   HENT:      Head: Normocephalic and atraumatic.      Nose: Nose normal.      Mouth/Throat:      Mouth: Mucous membranes are moist.   Eyes:      Pupils: Pupils are equal, round, and reactive to light.   Cardiovascular:      Rate and Rhythm: Normal rate and regular rhythm.      Pulses: Normal pulses.      Heart sounds: Normal heart sounds. No murmur heard.    No friction rub. No gallop.   Pulmonary:      Effort: Pulmonary effort is normal. No respiratory distress.      Breath sounds: Normal breath sounds.   Abdominal:      General: Abdomen is flat. Bowel sounds are normal. There is no distension.      Palpations: Abdomen is soft.      Tenderness: There is no abdominal tenderness.   Musculoskeletal:         General: No swelling. Normal range of motion.      Cervical back: Normal range of motion and neck supple.   Skin:     General: Skin is warm and dry.   Neurological:      Mental Status: He is alert and oriented to person, place, and time.      Comments: Mild residual left-sided weakness    Psychiatric:         Mood and Affect: Mood normal.         Behavior: Behavior normal.         Thought Content: Thought content normal.         Judgment: Judgment normal.       Significant Labs: All pertinent labs within the past 24 hours have been reviewed.    Significant Imaging: I have reviewed all pertinent imaging results/findings within the past 24 hours.

## 2023-03-25 NOTE — PT/OT/SLP EVAL
"Physical Therapy Evaluation    Patient Name:  Brennan Shah   MRN:  [id number]    Recommendations:     Discharge Recommendations: rehabilitation facility versus outpatient PT pending progress  Discharge Equipment Recommendations: none   Barriers to discharge: None    Assessment:     Brennan Shah is a 37 y.o. male admitted with a medical diagnosis of CVA (cerebral vascular accident).  He presents with the following impairments/functional limitations: weakness, impaired self care skills, impaired functional mobility, gait instability, impaired balance, decreased coordination, decreased upper extremity function, decreased lower extremity function, impaired coordination, impaired fine motor. Patient sitting EOB with OT present and is agreeable to participation with PT evaluation. He endorses left sided weakness and "heaviness" with onset the day before yesterday. Impaired coordination on the left. He requires CGA for sit to stand transfer and ambulated 50' with no AD, ataxia, CGA-Radha, and fatigue. 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     Rehab Prognosis: Good; patient would benefit from acute skilled PT services to address these deficits and reach maximum level of function.    Recent Surgery: * No surgery found *      Plan:     During this hospitalization, patient to be seen daily to address the identified rehab impairments via gait training, therapeutic activities, therapeutic exercises, neuromuscular re-education and progress toward the following goals:    Plan of Care Expires:  04/25/23    Subjective     Chief Complaint: weakness   Patient/Family Comments/goals: agrees with IRF versus OPPT  Pain/Comfort:  Pain Rating 1: 0/10    Patients cultural, spiritual, Sikhism conflicts given the current situation:      Living Environment:  Patient lives with mother in a [facility] with threshold to enter   Prior to admission, 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  Equipment used at home: none.  DME owned (not currently used): none.  Upon discharge, patient will have assistance from IRF versus OPPT.    Objective:     Communicated with OT Darryn prior to session.  Patient found sitting edge of bed with telemetry  upon PT entry to room.    General Precautions: Standard, fall  Orthopedic Precautions:N/A   Braces: N/A  Respiratory Status: Room air    Exams:  Fine Motor Coordination:    -       Intact  Right hand, finger to nose, Right hand thumb/finger opposition skills, and Right hand, diadochokinesis skill   -       Impaired  Left hand, finger to nose -past pointing, Left hand thumb/finger opposition skills -increased time to perform, and Left hand, diadochokinesis skill impaired after several trials  RLE Strength: 5/5  LLE Strength: 4/5    Functional Mobility:  Transfers:     Sit to Stand:  contact guard assistance with no AD  Gait: 50' with no AD, ataxia, CGA-Radha, and fatigue      AM-PAC 6 CLICK MOBILITY  Total Score:20       Treatment & Education:  Patient was educated on the importance of OOB activity and functional mobility to negate negative effects of prolonged bed rest during hospitalization, safe transfers and ambulation, IRF versus OPPT, and D/C planning     Patient left HOB elevated with all lines intact, call button in reach, and bed alarm on.    GOALS:   Multidisciplinary Problems       Physical Therapy Goals          Problem: Physical Therapy    Goal Priority Disciplines Outcome Goal Variances Interventions   Physical Therapy Goal     PT, PT/OT      Description: Goals to be met by: 23     Patient will increase functional independence with mobility by performin. Supine to sit with [place]  2. Sit to stand transfer with [place]  3. Bed to chair transfer with [place] using No Assistive Device  4. Gait  x 500 feet with [place] using No Assistive Device.   5. Lower extremity exercise  program x20 reps per handout, with independence                         History:     History reviewed. No pertinent past medical history.    History reviewed. No pertinent surgical history.    Time Tracking:     PT Received On: 03/25/23  PT Start Time: 1107     PT Stop Time: 1121  PT Total Time (min): 14 min     Billable Minutes: Evaluation 14      03/25/2023

## 2023-03-25 NOTE — PT/OT/SLP EVAL
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 male with a medical diagnosis of CVA (cerebral vascular accident).  Performance deficits affecting function: weakness, impaired endurance, impaired self care skills, impaired functional mobility, gait instability, impaired balance, impaired coordination, impaired fine motor, decreased upper extremity function, decreased lower extremity function, decreased coordination.      Pt presents with left side weakness/coordination deficits and decreased functional endurance; he would benefit from continued OT services in either inpatient rehab or outpatient setting to facilitate return to PLOF/work.     Rehab Prognosis: Good; patient would benefit from acute skilled OT services to address these deficits and reach maximum level of function.       Plan:     Patient to be seen 6 x/week to address the above listed problems via self-care/home management, therapeutic activities, therapeutic exercises, neuromuscular re-education  Plan of Care Expires: 04/24/23  Plan of Care Reviewed with: patient    Subjective     Chief Complaint: L side weakness   Patient/Family Comments/goals: return to work/PLOF    Occupational Profile:  Living Environment: Lives with his mother SSH threshold step to enter  Previous level of function: Independent  Roles and Routines: Works off shore   Equipment Used at Home: none  Assistance upon Discharge: mother/family     Pain/Comfort:  Pain Rating 1: 0/10  Pain Rating Post-Intervention 1:  0/10    Objective:     Communicated with: nurse prior to session.  Patient found supine with telemetry upon OT entry to room.    General Precautions: Standard, fall, aspiration  Orthopedic Precautions: N/A  Braces: N/A  Respiratory Status: Room air    Occupational Performance:    Bed Mobility:    Patient completed Supine to Sit with supervision  Patient completed Sit to Supine with supervision    Functional Mobility/Transfers:  Patient completed Sit <> Stand Transfer with contact guard assistance  with  no assistive device   Functional Mobility: CGA ~20ft no AD; ataxia; slow pace; fatigues quickly     Activities of Daily Living:  Upper Body Dressing: stand by assistance to don gown seated EOB  Lower Body Dressing: stand by assistance to don socks seated EOB  Grooming: stand by assist for washing hands standing at sink; no LOB    Cognitive/Visual Perceptual:  Cognitive/Psychosocial Skills:     -       Oriented to: Person, Place, Time, and Situation   -       Follows Commands/attention:Follows multistep  commands  -       Safety awareness/insight to disability: intact   Visual/Perceptual:      -Intact      Physical Exam:  Sensation:    -       Intact  Dominant hand:    -       Right  Upper Extremity Range of Motion:     -       Right Upper Extremity: WNL  -       Left Upper Extremity: WNL  Upper Extremity Strength:    -       Right Upper Extremity: 5/5  -       Left Upper Extremity: 4/5   Strength:    -       Right Upper Extremity: Good  -       Left Upper Extremity: Fair  Fine Motor Coordination:    -       Impaired  Left hand, finger to nose  , Left hand thumb/finger opposition skills  , and Left hand, manipulation of objects    Gross motor coordination:   ataxia    AMPAC 6 Click ADL:  AMPAC Total Score: 21    Treatment & Education:  Pt educated on OT role/POC, d/c recommendations; he verbalized understanding    Patient left sitting edge of bed with all lines intact, call button in reach, and PT in room to begin  evaluation    GOALS:   Multidisciplinary Problems       Occupational Therapy Goals          Problem: Occupational Therapy    Goal Priority Disciplines Outcome Interventions   Occupational Therapy Goal     OT, PT/OT     Description: Goals to be met by: 4/24/23     Patient will increase functional independence with ADLs by performing:    UE Dressing with Modified [place].  LE Dressing with Modified [place].  Grooming while standing at sink with Modified [place].  Toileting from toilet with Modified [place] for hygiene and clothing management.   Toilet transfer to toilet with Modified [place].  Upper extremity exercise program x10 reps per handout, with independence.                         History:     History reviewed. No pertinent past medical history.    History reviewed. No pertinent surgical history.    Time Tracking:     OT Date of Treatment: 03/25/23  OT Start Time: 1050  OT Stop Time: 1111  OT Total Time (min): 21 min    Billable Minutes:Evaluation 13  Self Care/Home Management 08    3/25/2023

## 2023-03-25 NOTE — HPI
37-year-old without any past medical history presented with chief complaint of left-sided weakness, imbalance.  Upon further asking patient mentioned that he was in his usual state of health until 2 days ago when he woke up his left side felt weak, he also noticed his gait was not steady.  His symptoms were progressively getting worse and he decided to come to ED. otherwise denies any fever, chills, headache, dizziness, chest pain, palpitations, nausea, vomiting, bladder or bowel symptoms.  He denies any prior history of CVA, CAD, hypertension.  Patient's mother has 3 strokes however she has multiple comorbidities including hypertension.     In ED patient was found to have left-sided motor weakness, coordination deficit, gait imbalance.  NIH of at least 4.  Hospital medicine was consulted for further workup.

## 2023-03-25 NOTE — PROGRESS NOTES
[facility] Medicine  Progress Note    Patient Name: Brennan Shah  MRN: [id number]  Patient Class: IP- Inpatient   Admission Date: 3/24/2023  Length of Stay: 1 days  Attending Physician: Lauro Heart MD  Primary Care Provider: Primary Doctor No        Subjective:     Principal Problem:CVA (cerebral vascular accident)        HPI:  37-year-old without any past medical history presented with chief complaint of left-sided weakness, imbalance.  Upon further asking patient mentioned that he was in his usual state of health until 2 days ago when he woke up his left side felt weak, he also noticed his gait was not steady.  His symptoms were progressively getting worse and he decided to come to ED. otherwise denies any fever, chills, headache, dizziness, chest pain, palpitations, nausea, vomiting, bladder or bowel symptoms.  He denies any prior history of CVA, CAD, hypertension.  Patient's mother has 3 strokes however she has multiple comorbidities including hypertension.     In ED patient was found to have left-sided motor weakness, coordination deficit, gait imbalance.  NIH of at least 4.  Hospital medicine was consulted for further workup.       Overview/Hospital Course:  No notes on file    Interval History:  Admitted overnight with acute left-sided weakness.  Found to have basal ganglia CVA.  The rest of the workup is still in process.  Neurology consult is pending.    Review of Systems   All other systems reviewed and are negative.  Objective:     Vital Signs (Most Recent):  Temp: 98.4 °F (36.9 °C) (03/25/23 1131)  Pulse: 63 (03/25/23 1131)  Resp: 18 (03/25/23 1131)  BP: (!) 94/54 (03/25/23 1131)  SpO2: 99 % (03/25/23 1131)   Vital Signs (24h Range):  Temp:  [97.8 °F (36.6 °C)-98.4 °F (36.9 °C)] 98.4 °F (36.9 °C)  Pulse:  [63-95] 63  Resp:  [15-18] 18  SpO2:  [98 %-100 %] 99 %  BP: ()/(54-77) 94/54     Weight: 71.7 kg (158 lb 1.6 oz)  Body mass index is 20.86 kg/m².    Intake/Output  Summary (Last 24 hours) at 3/25/2023 1227  Last data filed at 3/25/2023 1011  Gross per 24 hour   Intake 240 ml   Output 250 ml   Net -10 ml      Physical Exam  Vitals reviewed.   Constitutional:       Appearance: Normal appearance.   HENT:      Head: Normocephalic and atraumatic.      Nose: Nose normal.      Mouth/Throat:      Mouth: Mucous membranes are moist.   Eyes:      Pupils: Pupils are equal, round, and reactive to light.   Cardiovascular:      Rate and Rhythm: Normal rate and regular rhythm.      Pulses: Normal pulses.      Heart sounds: Normal heart sounds. No murmur heard.    No friction rub. No gallop.   Pulmonary:      Effort: Pulmonary effort is normal. No respiratory distress.      Breath sounds: Normal breath sounds.   Abdominal:      General: Abdomen is flat. Bowel sounds are normal. There is no distension.      Palpations: Abdomen is soft.      Tenderness: There is no abdominal tenderness.   Musculoskeletal:         General: No swelling. Normal range of motion.      Cervical back: Normal range of motion and neck supple.   Skin:     General: Skin is warm and dry.   Neurological:      Mental Status: He is alert and oriented to person, place, and time.      Comments: Mild residual left-sided weakness   Psychiatric:         Mood and Affect: Mood normal.         Behavior: Behavior normal.         Thought Content: Thought content normal.         Judgment: Judgment normal.       Significant Labs: All pertinent labs within the past 24 hours have been reviewed.    Significant Imaging: I have reviewed all pertinent imaging results/findings within the past 24 hours.      Assessment/Plan:      * Right basal ganglia stroke-out of tPA window  Patient was out of the tPA window on admission  CT showing basal ganglia stroke  MRI and MRA is pending  Carotid ultrasound without signs of stenosis  Echocardiogram with bubble study is pending  Neurology consulted  Continue aspirin Lipitor  Likely needs Plavix as well  PT  and OT evaluating  History of strokes in his family, question of whether he has hypercoagulability      Marijuana smoker        Left-sided Hemiparesis  Due to CVA  PT and OT working with him  May benefit from rehab        VTE Risk Mitigation (From admission, onward)         Ordered     IP VTE LOW RISK PATIENT  Once         03/24/23 2325     Place sequential compression device  Until discontinued         03/24/23 2325                Discharge Planning   ASHER:      Code Status: Full Code   Is the patient medically ready for discharge?:     Reason for patient still in hospital (select all that apply): Treatment  Discharge Plan A: Home with family                  Lauro Heart MD  Department of Hospital Medicine   [facility]

## 2023-03-25 NOTE — PLAN OF CARE
Problem: Adult Inpatient Plan of Care  Goal: Plan of Care Review  Outcome: Ongoing, Progressing  Goal: Patient-Specific Goal (Individualized)  Outcome: Ongoing, Progressing  Goal: Absence of Hospital-Acquired Illness or Injury  Outcome: Ongoing, Progressing  Goal: Optimal Comfort and Wellbeing  Outcome: Ongoing, Progressing  Goal: Readiness for Transition of Care  Outcome: Ongoing, Progressing     Problem: Adjustment to Illness (Stroke, Ischemic/Transient Ischemic Attack)  Goal: Optimal Coping  Outcome: Ongoing, Progressing     Problem: Bowel Elimination Impaired (Stroke, Ischemic/Transient Ischemic Attack)  Goal: Effective Bowel Elimination  Outcome: Ongoing, Progressing     Problem: Cerebral Tissue Perfusion (Stroke, Ischemic/Transient Ischemic Attack)  Goal: Optimal Cerebral Tissue Perfusion  Outcome: Ongoing, Progressing     Problem: Cognitive Impairment (Stroke, Ischemic/Transient Ischemic Attack)  Goal: Optimal Cognitive Function  Outcome: Ongoing, Progressing     Problem: Communication Impairment (Stroke, Ischemic/Transient Ischemic Attack)  Goal: Improved Communication Skills  Outcome: Ongoing, Progressing     Problem: Functional Ability Impaired (Stroke, Ischemic/Transient Ischemic Attack)  Goal: Optimal Functional Ability  Outcome: Ongoing, Progressing     Problem: Respiratory Compromise (Stroke, Ischemic/Transient Ischemic Attack)  Goal: Effective Oxygenation and Ventilation  Outcome: Ongoing, Progressing     Problem: Sensorimotor Impairment (Stroke, Ischemic/Transient Ischemic Attack)  Goal: Improved Sensorimotor Function  Outcome: Ongoing, Progressing     Problem: Swallowing Impairment (Stroke, Ischemic/Transient Ischemic Attack)  Goal: Optimal Eating and Swallowing without Aspiration  Outcome: Ongoing, Progressing     Problem: Urinary Elimination Impaired (Stroke, Ischemic/Transient Ischemic Attack)  Goal: Effective Urinary Elimination  Outcome: Ongoing, Progressing

## 2023-03-25 NOTE — SUBJECTIVE & OBJECTIVE
History reviewed. No pertinent past medical history.    History reviewed. No pertinent surgical history.    Review of patient's allergies indicates:  No Known Allergies    No current facility-administered medications on file prior to encounter.     Current Outpatient Medications on File Prior to Encounter   Medication Sig    acetaminophen (TYLENOL) 500 MG tablet Take 500 mg by mouth every 6 (six) hours as needed for Pain.    HYDROcodone-acetaminophen (NORCO) 5-325 mg per tablet Take 1 tablet by mouth every 4 (four) hours as needed.    LIDOcaine 4 % PtMd Apply 1 patch topically every 12 (twelve) hours.    naproxen (NAPROSYN) 500 MG tablet Take 1 tablet (500 mg total) by mouth 2 (two) times daily with meals.     Family History    None       Tobacco Use    Smoking status: Former     Types: Cigarettes     Quit date: 04/2019     Years since quitting: 3.9    Smokeless tobacco: Never   Substance and Sexual Activity    Alcohol use: Yes     Comment: socially    Drug use: No    Sexual activity: Yes     Partners: Female     Review of Systems   Constitutional:  Positive for fatigue. Negative for activity change and appetite change.   HENT:  Negative for congestion and sore throat.    Eyes:  Negative for discharge and visual disturbance.   Respiratory:  Negative for cough and chest tightness.    Cardiovascular:  Negative for chest pain and leg swelling.   Gastrointestinal:  Negative for abdominal pain and nausea.   Genitourinary:  Negative for dysuria and hematuria.   Musculoskeletal:  Negative for myalgias.   Skin:  Negative for pallor and rash.   Neurological:  Positive for weakness. Negative for dizziness.        Imbalance   Psychiatric/Behavioral:  Negative for behavioral problems. The patient is not nervous/anxious.    All other systems reviewed and are negative.  Objective:     Vital Signs (Most Recent):  Temp: 97.8 °F (36.6 °C) (03/24/23 2028)  Pulse: 95 (03/24/23 2028)  Resp: 16 (03/24/23 2028)  BP: 113/77 (03/24/23  2028)  SpO2: 98 % (03/24/23 2028) Vital Signs (24h Range):  Temp:  [97.8 °F (36.6 °C)] 97.8 °F (36.6 °C)  Pulse:  [95] 95  Resp:  [16] 16  SpO2:  [98 %] 98 %  BP: (113)/(77) 113/77     Weight: 74.8 kg (165 lb)  Body mass index is 21.77 kg/m².    Physical Exam  Vitals and nursing note reviewed.   Constitutional:       Appearance: He is well-developed.   HENT:      Head: Atraumatic.   Neck:      Vascular: No JVD.   Cardiovascular:      Rate and Rhythm: Normal rate and regular rhythm.      Heart sounds: Normal heart sounds. No murmur heard.    No gallop.   Pulmonary:      Effort: No respiratory distress.      Breath sounds: Normal breath sounds. No wheezing.   Abdominal:      General: Bowel sounds are normal. There is no distension.      Palpations: Abdomen is soft.      Tenderness: There is no guarding or rebound.   Musculoskeletal:      Cervical back: Neck supple.   Lymphadenopathy:      Cervical: No cervical adenopathy.   Skin:     General: Skin is warm.      Capillary Refill: Capillary refill takes less than 2 seconds.      Findings: No rash.   Neurological:      Mental Status: He is alert and oriented to person, place, and time.      Cranial Nerves: No cranial nerve deficit.      Comments: Left-sided hemiparesis noted, poor coordination on left side, unsteady gait           Significant Labs: All pertinent labs within the past 24 hours have been reviewed.    Significant Imaging: I have reviewed all pertinent imaging results/findings within the past 24 hours.

## 2023-03-25 NOTE — FIRST PROVIDER EVALUATION
Medical screening examination initiated.  I have conducted a focused provider triage encounter, findings are as follows:    Brief history of present illness:  Patient presents to the ED for left-sided we has periods 2 days ago when he woke up he noticed that his left arm in his left leg were more weak than his right side.  Patient states he feels like it has really gotten worse and his aunt recommended he come to get evaluated.  Patient denies any confusion or slurred speech.  Patient denies any known injuries, headache change in vision, syncope, chest pain or shortness of breath.  Patient denies any recent illness.  Patient has no significant past medical history and does not take any daily medications.      Vitals:    03/24/23 2028   BP: 113/77   BP Location: Left arm   Patient Position: Sitting   Pulse: 95   Resp: 16   Temp: 97.8 °F (36.6 °C)   TempSrc: Oral   SpO2: 98%   Weight: 74.8 kg (165 lb)       Pertinent physical exam:  Patient is awake no acute distress.  Patient has notable left-sided weakness on exam.  Patient has no slurred speech, oriented x3, and able to identify objects.    Brief workup plan:  CT, labs    Preliminary workup initiated; this workup will be continued and followed by the physician or advanced practice provider that is assigned to the patient when roomed.

## 2023-03-25 NOTE — CONSULTS
[facility]  Department of Neurology  Neurology Consultation Note        PATIENT NAME: Brennan Shah  MRN: [id number]  CSN: [id number]      TODAY'S DATE: 03/25/2023  ADMIT DATE: 3/24/2023                            CONSULTING PROVIDER: Alexi Courtney MD  CONSULT REQUESTED BY: Lauro Heart MD      Reason for consult: L side weakness       History obtained from chart review and the patient.    HPI per EMR: Brennan Shah is a 37 y.o. male without any past medical history presented with chief complaint of left-sided weakness, imbalance.  Upon further asking patient mentioned that he was in his usual state of health until 2 days ago when he woke up his left side felt weak, he also noticed his gait was not steady.  His symptoms were progressively getting worse and he decided to come to ED. otherwise denies any fever, chills, headache, dizziness, chest pain, palpitations, nausea, vomiting, bladder or bowel symptoms.  He denies any prior history of CVA, CAD, hypertension.  Patient's mother has 3 strokes however she has multiple comorbidities including hypertension.     Neurology consult:  Patient was seen examined by me this morning.  He is alert, oriented x3.  He states that the past 2 days, he has had left upper and lower extremity weakness and difficulty walking due to weakness in the left lower extremity.  He denies any slurred speech, vision trouble, nausea vomiting, sensory changes.  He smokes about 1 pack of cigarettes every 2-3 days occasionally drinks alcohol.  He has a family history of stroke his aunt who had her 1st stroke at age of 46.    PREVIOUS MEDICAL HISTORY:  History reviewed. No pertinent past medical history.  PREVIOUS SURGICAL HISTORY:  History reviewed. No pertinent surgical history.  FAMILY MEDICAL HISTORY:  History reviewed. No pertinent family history.  SOCIAL HISTORY:  Social History     Tobacco Use    Smoking status: Former     Types: Cigarettes     Quit date: 04/2019      "Years since quitting: 3.9    Smokeless tobacco: Never   Substance Use Topics    Alcohol use: Yes     Comment: socially    Drug use: No     ALLERGIES:  Review of patient's allergies indicates:  No Known Allergies  HOME MEDICATIONS:  Prior to Admission medications    Medication Sig Start Date End Date Taking? Authorizing Provider   acetaminophen (TYLENOL) 500 MG tablet Take 500 mg by mouth every 6 (six) hours as needed for Pain.    Historical Provider   HYDROcodone-acetaminophen (NORCO) 5-325 mg per tablet Take 1 tablet by mouth every 4 (four) hours as needed. 6/18/19   Kalee Avelar PA-C   LIDOcaine 4 % PtMd Apply 1 patch topically every 12 (twelve) hours. 12/8/22   Gabby Millan MD   naproxen (NAPROSYN) 500 MG tablet Take 1 tablet (500 mg total) by mouth 2 (two) times daily with meals. 12/8/22   Gabby Millan MD     CURRENT SCHEDULED MEDICATIONS:   aspirin  81 mg Oral Daily    atorvastatin  40 mg Oral QHS     CURRENT INFUSIONS:    CURRENT PRN MEDICATIONS:  labetalol, ondansetron, sodium chloride 0.9%    REVIEW OF SYSTEMS:  Please refer to the HPI for all pertinent positive and negative findings. A comprehensive review of all other systems was negative.       PHYSICAL EXAM:  Patient Vitals for the past 24 hrs:   BP Temp Temp src Pulse Resp SpO2 Height Weight   03/25/23 0743 110/63 97.8 °F (36.6 °C) Oral 69 18 98 % -- --   03/25/23 0420 -- -- -- -- -- -- -- 71.7 kg (158 lb 1.6 oz)   03/25/23 0411 -- -- -- -- -- -- 6' 1" (1.854 m) --   03/25/23 0155 115/66 97.9 °F (36.6 °C) Oral 68 17 98 % -- 71.7 kg (158 lb 1.6 oz)   03/25/23 0000 (!) 119/59 -- -- 66 15 99 % -- --   03/24/23 2300 106/62 -- -- 64 15 98 % -- --   03/24/23 2200 108/69 -- -- 68 15 100 % -- --   03/24/23 2028 113/77 97.8 °F (36.6 °C) Oral 95 16 98 % -- 74.8 kg (165 lb)       GENERAL APPEARANCE: Alert, well-developed, well-nourished male in no acute distress.  HEENT: Normocephalic and atraumatic. PERRL. Oropharynx unremarkable.  PULM: " Normal respiratory effort. No accessory muscle use.  CV: RRR.  ABDOMEN: Soft, nontender.  EXTREMITIES: No obvious signs of vascular compromise. Pulses present. No cyanosis, clubbing or edema.  SKIN: Clear; no rashes, lesions or skin breaks in exposed areas.    NEURO:  MENTAL STATUS: Patient awake and oriented to time, place, and person, recent/remote memory normal, attention span/concentration normal, and speech fluent without paraphasic errors.  Affect euthymic.    CRANIAL NERVES:  CN I: Not tested.  CN II: Fundoscopic exam deferred.  CN III, IV, VI: Pupils equal, round and reactive to light.  Extraocular movements full and intact.  CN V: Facial sensation normal.  CN VII: Facial asymmetry absent.  CN VIII: Hearing grossly normal and equal bilaterally.  No skew deviation or pathologic nystagmus.  CN IX, X: Palate elevates symmetrically. Speech/articulation is clear without dysarthria.  CN XI: Shoulder shrug and chin rotation equal with good strength.  CN XII: Tongue protrusion midline.    MOTOR:  Bulk normal. Tone normal and symmetric throughout.  Abnormal movements absent.  Tremor: none present.  Strength  5/5 in right upper lower extremity, 4+/5 in left upper and lower extremities .    REFLEXES:  DTRs 2+ throughout.  Plantar response equivocal bilaterally.  SENSATION: grossly intact throughout.  COORDINATION: normal finger-to-nose.  STATION: not tested.  GAIT: not tested.      NIHSS:  1a      Level of Consciousness (alert, drowsy, etc.):   0=alert; keenly responsive  1b.     Level of Consciousness Questions (month, age): 0= able to answer both questions  1c.     Level of Consciousness Commands (open, close eyes, make fist, let go):  0=Answers both tasks correctly  2.      Best Gaze (eyes open - patient follows examiner's finger or face):      0=normal  3.      Visual (introduce visual stimulus/threat to patient's visual field quadrants):  0=No visual loss  4.      Facial Palsy (show teeth, raise eyebrows and  squeeze eyes shut):        0=Normal symmetric movement  5a.     Motor Arm - Left (elevate extremity 90 degrees and score drift/movement):       1=Drift, limb holds 90 (or 45) degrees but drifts down before full 10 seconds: does not hit bed  5b.     Motor Arm - Right (elevate extremity 90 degrees and score drift/movement):      0=No drift, limb holds 90 (or 45) degrees for full 10 seconds  6a.     Motor Leg - Left (elevate extremity 30 degrees and score drift/movement):       1=Drift, limb holds 90 (or 45) degrees but drifts down before full 10 seconds: does not hit bed  6b      Motor Leg - Right (elevate extremity 30 degrees and score drift/movement):      0=No drift, limb holds 90 (or 45) degrees for full 10 seconds  7.      Limb Ataxia (finger-nose, heel down shin):      0=Absent  8.      Sensory (pin prick to face, arm, trunk, and leg - compare side to side):        0=Normal; no sensory loss  9.      Best Language (name items, describe a picture and read sentences):      0=No aphasia, normal  10.     Dysarthria (evaluate speech clarity by patient repeating listed words): 0=Normal  11.     Extinction and Inattention (use prior testing to identify neglect or double simultaneous stimuli testing):      0=No abnormality          NIH Stroke Scale Total:         2      Labs:  Recent Labs   Lab 03/24/23 2128 03/25/23 0452    137   K 4.0 3.9    105   CO2 29 26   BUN 9 12   CREATININE 1.1 0.9    108   CALCIUM 9.5 9.1   PHOS  --  3.8   MG  --  1.8     Recent Labs   Lab 03/24/23 2128 03/25/23  0452   WBC 6.64 7.12   HGB 15.2 14.8   HCT 47.1 44.6    186     Recent Labs   Lab 03/24/23 2128 03/25/23 0452   ALBUMIN 4.5 4.0   PROT 7.1 6.4   BILITOT 0.6 0.5   ALKPHOS 50* 44*   ALT 13 15   AST 19 19     Lab Results   Component Value Date    INR 1.1 03/24/2023     Lab Results   Component Value Date    TRIG 129 03/24/2023    HDL 55 03/24/2023    CHOLHDL 29.9 03/24/2023     Lab Results   Component Value  Date    HGBA1C 5.8 03/25/2023     No results found for: PROTEINCSF, GLUCCSF, WBCCSF    Imaging:  I have reviewed and interpreted the pertinent imaging and lab results.      US Carotid Bilateral  CLINICAL HISTORY:  CVA    EXAMINATION:  US CAROTID BILATERAL    COMPARISON:  No comparison.    FINDINGS:  Peak systolic velocities are listed below (all values in cm/s).    RIGHT  - CCA PROX PSV     -  - CCA MID PSV         103  - CCA DIST PSV       -  - ICA P PSV              55  - ICA M PSV             86  - ICA D PSV              73  - ECA                        93  - VERT                      71  antegrade.  - ICA/CCA                 0.8    LEFT  - CCA PROX PSV     -  - CCA MID PS           115  - CCA DIST PSV       -  - ICA P PSV               88  - ICA M PSV               77  - ICA D PSV               68  - ECA                         38  - VERT                       70  antegrade.  - ICA/CCA                  0.8    CAROTID STENOSIS REFERENCE USING SRU CRITERIA:  Mild - <50% stenosis. ICA PSV is less than 125 cm/second and plaque or intimal thickening is visible.    Moderate - 50-69% stenosis. ICA PSV is 125 to 230 cm/second and plaque is visible.    Severe - 70-94% stenosis. ICA PSV is more than 230 cm/second and visible plaque with lumen narrowing is seen.    Near occlusion - 95-99% stenosis. ICA PSV is variable and significant plaque with luminal narrowing is seen.    Occluded - 100% stenosis. No flow identified.    IMPRESSION:  There is no significant degree of atherosclerotic change or stenosis identified within the bilateral common carotid or bilateral internal carotid arteries.    Electronically signed by:  Paxton Jones DO  3/25/2023 4:12 AM CDT Workstation: [id number]         ASSESSMENT & PLAN:      L side weakness    Workup  CTH:  Hypodensity in the right basal ganglia  MRI brain:  DWI hyperintensity in the right basal ganglia region however there is no ADC correlate.  Likely T2 shine through.  Extensive FLAIR  hyperintensity noted in the periventricular area significantly more for patient's age.  MRA head:  Normal MR angio brain  MRA neck: pending  ECHO:  pending   LDL:   HbA1c: pending       Plan  Patient presented with sudden onset of left-sided weakness for the past 2 days.  MRI brain showed DWI hyperintensity without ADC correlate on the right basal ganglia region and extensive FLAIR hyperintensity periventricular areas.  Given there is no clear ADC correlate further DWI hyperintensity, concern for an acute stroke is low and this could be a subacute stroke versus demyelinating lesion.  Start with Aspirin 81 mg and Lipitor 80mg for stroke prevention  Will repeat MRI brain with contrast and also get MRI C-spine with and without contrast.   Permissive BP to 220 systolic for 24 hrs from symptom onset and after that normalize BP  PT OT  Speech therapy  DVT prophylaxis with chemo/SCD prophylaxis  Discussed lifestyle modifications as prophylactic measures for stroke prevention including smoking cessation, adequate blood pressure management, healthy diet and regular exercise.          Thank you kindly for including us in the care of this patient. Please do not hesitate to contact us with any questions.          Alexi Courtney MD  Neurology/vascular Neurology  Date of Service: 03/25/2023  10:30 AM    --------------------------------------------------------------------------------------------------------------------------------------------------------------------------------------------------------------------------------------------------------------  Please note: This note was transcribed using voice recognition software. Because of this technology there are often uinintended grammatical, spelling, and other transcription errors. Please disregard these errors.

## 2023-03-25 NOTE — PT/OT/SLP EVAL
Speech Language Pathology Evaluation  Bedside Swallow    Patient Name:  Brennan Shah   MRN:  [id number]  Admitting Diagnosis: CVA (cerebral vascular accident)    Recommendations:                 General Recommendations:  Follow-up not indicated  Diet recommendations:  Regular, Thin   Aspiration Precautions: Standard aspiration precautions   General Precautions: Standard, fall  Communication strategies:  none    History:   (Obtained from H&P)  HPI: 37-year-old without any past medical history presented with chief complaint of left-sided weakness, imbalance.  Upon further asking patient mentioned that he was in his usual state of health until 2 days ago when he woke up his left side felt weak, he also noticed his gait was not steady.  His symptoms were progressively getting worse and he decided to come to ED. otherwise denies any fever, chills, headache, dizziness, chest pain, palpitations, nausea, vomiting, bladder or bowel symptoms.  He denies any prior history of CVA, CAD, hypertension.  Patient's mother has 3 strokes however she has multiple comorbidities including hypertension.      In ED patient was found to have left-sided motor weakness, coordination deficit, gait imbalance.  NIH of at least 4.  Hospital medicine was consulted for further workup.   History reviewed. No pertinent past medical history.    History reviewed. No pertinent surgical history.    Social History: Patient lives with his mother.    Prior Intubation HX:  none this admission per chart review  Imaging Results              US Carotid Bilateral (Final result)  Result time 03/25/23 04:12:52      Final result by Paxton Jones DO (03/25/23 04:12:52)                   Narrative:    CLINICAL HISTORY:  CVA    EXAMINATION:  US CAROTID BILATERAL    COMPARISON:  No comparison.    FINDINGS:  Peak systolic velocities are listed below (all values in cm/s).    RIGHT  - CCA PROX PSV     -  - CCA MID PSV         103  - CCA DIST PSV       -  - ICA P PSV               55  - ICA M PSV             86  - ICA D PSV              73  - ECA                        93  - VERT                      71  antegrade.  - ICA/CCA                 0.8    LEFT  - CCA PROX PSV     -  - CCA MID PS           115  - CCA DIST PSV       -  - ICA P PSV               88  - ICA M PSV               77  - ICA D PSV               68  - ECA                         38  - VERT                       70  antegrade.  - ICA/CCA                  0.8      CAROTID STENOSIS REFERENCE USING SRU CRITERIA:  Mild - <50% stenosis. ICA PSV is less than 125 cm/second and plaque or intimal thickening is visible.    Moderate - 50-69% stenosis. ICA PSV is 125 to 230 cm/second and plaque is visible.    Severe - 70-94% stenosis. ICA PSV is more than 230 cm/second and visible plaque with lumen narrowing is seen.    Near occlusion - 95-99% stenosis. ICA PSV is variable and significant plaque with luminal narrowing is seen.    Occluded - 100% stenosis. No flow identified.    IMPRESSION:  There is no significant degree of atherosclerotic change or stenosis identified within the bilateral common carotid or bilateral internal carotid arteries.    Electronically signed by:  Paxton Jones DO  3/25/2023 4:12 AM CDT Workstation: [id number]                                     CT Head Without Contrast (Final result)  Result time 03/24/23 22:57:01      Final result by Paxton Dove MD (03/24/23 22:57:01)                   Narrative:    Head CT scan without contrast    COMPARISONS: None    ADDITIONAL PERTINENT HISTORY: Acute stroke    TECHNIQUE:  Multiple axial images were obtained from the skull base to the vertex without IV contrast. One of the following dose optimization techniques was utilized in the performance of this exam: Automated exposure control; adjustment of the mA and/or kV according to the patient's size; or use of an iterative  reconstruction technique.  Specific details can be referenced in the facility's radiology CT exam  operational policy.    FINDINGS:    Midline shift: Negative    Ventricles:  Negative    Brain parenchyma:  Small lacunar infarct in the right basal ganglia.    Extra-axial spaces:  Negative    Intracranial vasculature:  Negative    Osseous structures:  Negative    Paranasal sinuses and mastoid air cells:  Mild mucosal thickening involving the maxillary sinuses and ethmoid air cells    Surrounding soft tissues and orbits:  Negative      IMPRESSION:  1. Small lacunar infarct involving the right basal ganglion.  2. Underlying paranasal sinus disease.  3. No acute intracranial pathology    Electronically signed by:  Paxton Dove MD  3/24/2023 10:57 PM CDT Workstation: [id number]                                      Prior diet: Pt denies modified diet.    Subjective     Pt awake/alert, cooperative with ST evaluation.  Patient goals: to get some sleep     Pain/Comfort:  Pain Rating 1: 0/10 (none reported)    Respiratory Status: Room air    Objective:     Oral Musculature Evaluation  Oral Musculature: WFL  Dentition: present and adequate  Secretion Management: adequate  Mucosal Quality: good  Mandibular Strength and Mobility: WFL  Oral Labial Strength and Mobility: WFL  Lingual Strength and Mobility: WFL  Velar Elevation: WFL  Buccal Strength and Mobility: WFL  Volitional Cough: Present, adequate  Volitional Swallow: Present, adequate hyolaryngeal lift upon digital palpation  Voice Prior to PO Intake: clear, no dysphonia    Bedside Swallow Eval:   Consistencies Assessed:  Thin liquid: ice chip x1, 5ml x1, 10ml x1, 3oz via consecutive straw sips, self regulated cup/straw  Solid: 1/4 cracker x2     Oral Phase:   Intact labial seal to cup edge and for spoon stripping   Adequate oral containment without anterior spillage across consistencies   Timely mastication with visible rotary chew   No evidence of appreciable oral residue .    Pharyngeal Phase:   No overt signs or symptoms of aspiration or airway threat  Clear vocal  quality maintained   Completed uninterrupted drinking of 3 oz water with no overt s/s of aspiration (i.e., immediate coughing or choking), passing 3oz water challenge. Risk of aspiration not indicated although cannot be ruled out at bedside.   Multiple swallows not observed across consistencies trialled.   Globus sensation not promoted     Compensatory Strategies  None     Cognitive Communication: Alert and oriented x4. Able to follow commands within unstructured context. Fluent and appropriate at the conversational level. Recalls recent medical events and procedures.       Assessment:     Brennan Shah is a 37 y.o. male with an SLP diagnosis of  R basal ganglia stroke .  He presents with no o/m deficits and no clinical evidence of aspiration or airway threat.  Pt Ox4, fluent and appropriate at the conversation level.  Skilled ST services not indicated at this time.    Goals:   Multidisciplinary Problems       SLP Goals       Not on file                    Plan:     Patient to be seen:      Plan of Care expires:  03/25/23  Plan of Care reviewed with:  patient, other (see comments) (MD)   SLP Follow-Up:  No       Discharge recommendations:  other (see comments) (TBD)   Barriers to Discharge:  None    Time Tracking:     SLP Treatment Date:   03/25/23  Speech Start Time:  1153  Speech Stop Time:  1210     Speech Total Time (min):  17 min    Billable Minutes: Eval Swallow and Oral Function 17 minutes    03/25/2023

## 2023-03-25 NOTE — ED PROVIDER NOTES
Encounter Date: 3/24/2023       History     Chief Complaint   Patient presents with    Leg Pain     Left leg pain and heaviness x2 days    Arm Pain     Left arm pain and heaviness x2 days     Emergent evaluation of a 37-year-old male with positive family history of multiple CVAs in her mother in her 40s with no significant personal history.  Reports he did smoke until 2019.  No history of hypertension or AFib.  Presents to the ER due to weakness in the left arm and left leg that he woke with 2 days ago.  He reports he is having difficulty walking in his having to lift the left leg to walk.  Has noticed a limping gait.  He is not noticed any speech changes or slurring.  His mother feels that his left face is mildly weak.  And he says it feels different when he is smiling.  He denies any headache.  No dizziness or lightheadedness no vision changes he denies numbness or tingling to his extremities.          Review of patient's allergies indicates:  No Known Allergies  History reviewed. No pertinent past medical history.  History reviewed. No pertinent surgical history.  History reviewed. No pertinent family history.  Social History     Tobacco Use    Smoking status: Former     Types: Cigarettes     Quit date: 04/2019     Years since quitting: 3.9    Smokeless tobacco: Never   Substance Use Topics    Alcohol use: Yes     Comment: socially    Drug use: No     Review of Systems   Constitutional:  Negative for activity change, appetite change, chills, diaphoresis, fatigue and fever.   HENT:  Negative for congestion, postnasal drip and rhinorrhea.    Eyes:  Negative for visual disturbance.   Respiratory:  Negative for cough, chest tightness, shortness of breath and wheezing.    Cardiovascular:  Negative for chest pain and palpitations.   Gastrointestinal:  Negative for abdominal pain, constipation, diarrhea, nausea and vomiting.   Genitourinary:  Negative for dysuria, frequency and urgency.   Musculoskeletal:  Negative for  arthralgias, myalgias, neck pain and neck stiffness.   Allergic/Immunologic: Negative for immunocompromised state.   Neurological:  Positive for facial asymmetry and weakness. Negative for dizziness, tremors, seizures, syncope, speech difficulty, light-headedness, numbness and headaches.   Psychiatric/Behavioral:  Negative for confusion.    All other systems reviewed and are negative.    Physical Exam     Initial Vitals [03/24/23 2028]   BP Pulse Resp Temp SpO2   113/77 95 16 97.8 °F (36.6 °C) 98 %      MAP       --         Physical Exam    Nursing note and vitals reviewed.  Constitutional: He appears well-developed and well-nourished. He is not diaphoretic. No distress.   HENT:   Head: Normocephalic and atraumatic.   Right Ear: External ear normal.   Left Ear: External ear normal.   Nose: Nose normal.   Mouth/Throat: Oropharynx is clear and moist.   Eyes: Conjunctivae and EOM are normal. Pupils are equal, round, and reactive to light.   Neck: Neck supple. No tracheal deviation present.   Normal range of motion.  Cardiovascular:  Normal rate, regular rhythm, normal heart sounds and intact distal pulses.     Exam reveals no gallop and no friction rub.       No murmur heard.  Pulmonary/Chest: Breath sounds normal. No stridor. No respiratory distress. He has no wheezes. He has no rhonchi. He has no rales. He exhibits no tenderness.   Abdominal: Abdomen is soft. Bowel sounds are normal. He exhibits no distension and no mass. There is no abdominal tenderness. There is no rebound and no guarding.   Musculoskeletal:         General: No edema. Normal range of motion.      Cervical back: Normal range of motion and neck supple.     Neurological: He is alert and oriented to person, place, and time. A cranial nerve deficit is present. No sensory deficit. GCS score is 15. GCS eye subscore is 4. GCS verbal subscore is 5. GCS motor subscore is 6.   Skin: Skin is warm and dry. No rash noted. No erythema. No pallor.   Psychiatric:  He has a normal mood and affect. His behavior is normal. Judgment and thought content normal.       ED Course   Procedures  Labs Reviewed   COMPREHENSIVE METABOLIC PANEL - Abnormal; Notable for the following components:       Result Value    Alkaline Phosphatase 50 (*)     All other components within normal limits   URINALYSIS, REFLEX TO URINE CULTURE - Abnormal; Notable for the following components:    Urobilinogen, UA 2.0-3.0 (*)     All other components within normal limits    Narrative:     Specimen Source->Urine   DRUG SCREEN PANEL, URINE EMERGENCY - Abnormal; Notable for the following components:    THC Presumptive Positive (*)     All other components within normal limits    Narrative:     Specimen Source->Urine   CBC W/ AUTO DIFFERENTIAL   PROTIME-INR   TSH   LIPID PANEL   COMPREHENSIVE METABOLIC PANEL   MAGNESIUM   PHOSPHORUS   HEMOGLOBIN A1C   CBC W/ AUTO DIFFERENTIAL   TROPONIN I HIGH SENSITIVITY   CK-MB          Imaging Results              US Carotid Bilateral (In process)                      CT Head Without Contrast (Final result)  Result time 03/24/23 22:57:01      Final result by Paxton Dove MD (03/24/23 22:57:01)                   Narrative:    Head CT scan without contrast    COMPARISONS: None    ADDITIONAL PERTINENT HISTORY: Acute stroke    TECHNIQUE:  Multiple axial images were obtained from the skull base to the vertex without IV contrast. One of the following dose optimization techniques was utilized in the performance of this exam: Automated exposure control; adjustment of the mA and/or kV according to the patient's size; or use of an iterative  reconstruction technique.  Specific details can be referenced in the facility's radiology CT exam operational policy.    FINDINGS:    Midline shift: Negative    Ventricles:  Negative    Brain parenchyma:  Small lacunar infarct in the right basal ganglia.    Extra-axial spaces:  Negative    Intracranial vasculature:  Negative    Osseous structures:   Negative    Paranasal sinuses and mastoid air cells:  Mild mucosal thickening involving the maxillary sinuses and ethmoid air cells    Surrounding soft tissues and orbits:  Negative      IMPRESSION:  1. Small lacunar infarct involving the right basal ganglion.  2. Underlying paranasal sinus disease.  3. No acute intracranial pathology    Electronically signed by:  Paxton Dove MD  3/24/2023 10:57 PM CDT Workstation: [id number]                                     Medications   sodium chloride 0.9% flush 10 mL (has no administration in time range)   labetaloL injection 10 mg (has no administration in time range)   ondansetron injection 4 mg (has no administration in time range)   atorvastatin tablet 40 mg (has no administration in time range)   aspirin EC tablet 81 mg (has no administration in time range)   aspirin tablet 325 mg (325 mg Oral Given 3/25/23 0001)     Medical Decision Making:   Clinical Tests:   Lab Tests: Ordered and Reviewed  The following lab test(s) were unremarkable: CBC, Urinalysis, CMP and PT       <> Summary of Lab: Toxneg   Radiological Study: Ordered and Reviewed  ED Management:  Emergent evaluation of a 37-year-old male with positive family history of multiple CVAs in her mother in her 40s with no significant personal history.  Reports he did smoke until 2019.  No history of hypertension or AFib.  Presents to the ER due to weakness in the left arm and left leg that he woke with 2 days ago.  He reports he is having difficulty walking in his having to lift the left leg to walk.  Has noticed a limping gait.  He is not noticed any speech changes or slurring.  His mother feels that his left face is mildly weak.  And he says it feels different when he is smiling.  He denies any headache.  No dizziness or lightheadedness no vision changes he denies numbness or tingling to his extremities.    On physical exam vital signs are normal blood pressure was 106 systolic.  Patient was laying in bed.  He has a  NIH of 4 due to weakness with the left arm and left leg both drifting but not hitting the bed, mild left lower facial droop, and antalgic gait.  Patient is well out of the window for thrombolytics due to symptoms being present for 2 days.  Otherwise normal physical exam.  MDM    Patient presents for emergent evaluation of acute left-sided arm and leg weakness that poses a possible threat to life and/or bodily function.    Differential diagnosis includes but is not limited to CVA, intracranial hemorrhage, complex migraine, seizure, conversion disorder, spinal cord injury  In the ED patient found to have acute right basal ganglia lacunar infarct with left-sided arm and leg weakness and left-sided facial droop  I ordered labs and personally reviewed them.  Labs significant for see above     I ordered CT scan and personally reviewed it and reviewed the radiologist interpretation.  CT head iwthout contrast significant for Small lacunar infarct involving the right basal ganglion.   2. Underlying paranasal sinus disease.   3. No acute intracranial pathology     Admission MDM  I discussed the patient presentation labs, ekg, X-rays, CT findings with the Hospitalist Dr Awan for admit   Patient was managed in the ED with aspirin 325 mg orally   The response to treatment was good.    Patient required emergent consultation to hospitalist for admission.  Natacha Salinas M.D.     Other:   I have discussed this case with another health care provider.  Additional MDM:     NIH Stroke Scale:   Interval = baseline (upon arrival/admit)  Level of consciousness = 0 - alert  LOC questions = 0 - answers both correctly  LOC commands = 0 - performs both correctly  Best gaze = 0 - normal  Visual = 0 - no visual loss  Facial palsy = 1 - minor  Motor left arm =  1 - drift  Motor right arm =  0 - no drift  Motor left leg = 1 - drift  Motor right leg =  0 - no drift  Limb ataxia = 0 - absent  Sensory = 0 - normal  Best language = 0 - no  aphasia  Dysarthria = 0 - normal articulation  Extinction and inattention = 0 - no neglect  NIH Stroke Scale Total = 3                     Clinical Impression:   Final diagnoses:  [I63.9] Stroke  [I63.81] Right-sided lacunar infarction (Primary)  [R53.1] Acute left-sided weakness  [I63.9] CVA (cerebral vascular accident)        ED Disposition Condition    Admit Stable                Natacha Salinas MD  03/25/23 0115

## 2023-03-25 NOTE — PLAN OF CARE
[facility]  Initial Discharge Assessment       Primary Care Provider: Primary Doctor No    Admission Diagnosis: CVA (cerebral vascular accident) [I63.9]    Admission Date: 3/24/2023  Expected Discharge Date:     Discharge Barriers Identified: None    Payor: BLUE CROSS [employer] [facility] / Plan: BCBS ALL OUT OF STATE / Product Type: PPO /     Extended Emergency Contact Information  Primary Emergency Contact: Leona Crocker  Home Phone: [phone]  Mobile Phone: [phone]  Relation: Significant other  Preferred language: English   needed? No    Discharge Plan A: Home with family  Discharge Plan B: Home with family      [id number] - [place] - [address] [facility] [address]  [id number]  [facility] [address]  Phone: [phone] Fax: [phone]    Assessement completed with pt, s/o, mother and medical record. Pt does not have significant PMH. Pt worked full time and completely independent with all ADL's .NO Poa or Advance Directives at this time .   Initial Assessment (most recent)       Adult Discharge Assessment - 03/25/23 0932          Discharge Assessment    Assessment Type Discharge Planning Assessment     Confirmed/corrected address, phone number and insurance Yes     Confirmed Demographics Correct on Facesheet     Source of Information patient;family;health record     Reason For Admission Right basal ganglia stroke-out of tPA window     People in Home significant other     Facility Arrived From: home     Do you expect to return to your current living situation? Yes     Do you have help at home or someone to help you manage your care at home? Yes     Who are your caregiver(s) and their phone number(s)? Leona Crocker (Significant other)   [phone] (Mobile)     Prior to hospitilization cognitive status: Alert/Oriented     Current cognitive status: Alert/Oriented     Equipment Currently Used at Home none     Readmission within 30 days?  No     Patient currently being followed by outpatient case management? No     Do you currently have service(s) that help you manage your care at home? No     Do you take prescription medications? No     Do you have prescription coverage? Yes     Coverage Blue Cross Blue Shield     Do you have any problems affording any of your prescribed medications? No     Who is going to help you get home at discharge? Leona Crocker (Significant other)   [phone] (Mobile)     How do you get to doctors appointments? car, drives self     Are you on dialysis? No     Do you take coumadin? No     Discharge Plan A Home with family     Discharge Plan B Home with family     DME Needed Upon Discharge  none     Discharge Plan discussed with: Spouse/sig other     Name(s) and Number(s) Leona Crocker (Significant other)   [phone] (Mobile)     Discharge Barriers Identified None

## 2023-03-25 NOTE — PROGRESS NOTES
MRI/MRA of the brain was done. Patient came in with left sided weakness. Symptoms began on yesterday.

## 2023-03-25 NOTE — H&P
[facility], [facility] Medicine  History & Physical    Patient Name: Brennan Shah  MRN: [id number]  Patient Class: IP- Inpatient  Admission Date: 3/24/2023  Attending Physician: Brianna Awan MD   Primary Care Provider: Primary Doctor No         Patient information was obtained from patient, relative(s), past medical records and ER records.     Subjective:     Principal Problem:CVA (cerebral vascular accident)    Chief Complaint:   Chief Complaint   Patient presents with    Leg Pain     Left leg pain and heaviness x2 days    Arm Pain     Left arm pain and heaviness x2 days        HPI: 37-year-old without any past medical history presented with chief complaint of left-sided weakness, imbalance.  Upon further asking patient mentioned that he was in his usual state of health until 2 days ago when he woke up his left side felt weak, he also noticed his gait was not steady.  His symptoms were progressively getting worse and he decided to come to ED. otherwise denies any fever, chills, headache, dizziness, chest pain, palpitations, nausea, vomiting, bladder or bowel symptoms.  He denies any prior history of CVA, CAD, hypertension.  Patient's mother has 3 strokes however she has multiple comorbidities including hypertension.     In ED patient was found to have left-sided motor weakness, coordination deficit, gait imbalance.  NIH of at least 4.  Hospital medicine was consulted for further workup.       History reviewed. No pertinent past medical history.    History reviewed. No pertinent surgical history.    Review of patient's allergies indicates:  No Known Allergies    No current facility-administered medications on file prior to encounter.     Current Outpatient Medications on File Prior to Encounter   Medication Sig    acetaminophen (TYLENOL) 500 MG tablet Take 500 mg by mouth every 6 (six) hours as needed for Pain.    HYDROcodone-acetaminophen (NORCO) 5-325 mg per tablet Take 1 tablet by mouth every  4 (four) hours as needed.    LIDOcaine 4 % PtMd Apply 1 patch topically every 12 (twelve) hours.    naproxen (NAPROSYN) 500 MG tablet Take 1 tablet (500 mg total) by mouth 2 (two) times daily with meals.     Family History    None       Tobacco Use    Smoking status: Former     Types: Cigarettes     Quit date: 04/2019     Years since quitting: 3.9    Smokeless tobacco: Never   Substance and Sexual Activity    Alcohol use: Yes     Comment: socially    Drug use: No    Sexual activity: Yes     Partners: Female     Review of Systems   Constitutional:  Positive for fatigue. Negative for activity change and appetite change.   HENT:  Negative for congestion and sore throat.    Eyes:  Negative for discharge and visual disturbance.   Respiratory:  Negative for cough and chest tightness.    Cardiovascular:  Negative for chest pain and leg swelling.   Gastrointestinal:  Negative for abdominal pain and nausea.   Genitourinary:  Negative for dysuria and hematuria.   Musculoskeletal:  Negative for myalgias.   Skin:  Negative for pallor and rash.   Neurological:  Positive for weakness. Negative for dizziness.        Imbalance   Psychiatric/Behavioral:  Negative for behavioral problems. The patient is not nervous/anxious.    All other systems reviewed and are negative.  Objective:     Vital Signs (Most Recent):  Temp: 97.8 °F (36.6 °C) (03/24/23 2028)  Pulse: 95 (03/24/23 2028)  Resp: 16 (03/24/23 2028)  BP: 113/77 (03/24/23 2028)  SpO2: 98 % (03/24/23 2028) Vital Signs (24h Range):  Temp:  [97.8 °F (36.6 °C)] 97.8 °F (36.6 °C)  Pulse:  [95] 95  Resp:  [16] 16  SpO2:  [98 %] 98 %  BP: (113)/(77) 113/77     Weight: 74.8 kg (165 lb)  Body mass index is 21.77 kg/m².    Physical Exam  Vitals and nursing note reviewed.   Constitutional:       Appearance: He is well-developed.   HENT:      Head: Atraumatic.   Neck:      Vascular: No JVD.   Cardiovascular:      Rate and Rhythm: Normal rate and regular rhythm.      Heart sounds:  Normal heart sounds. No murmur heard.    No gallop.   Pulmonary:      Effort: No respiratory distress.      Breath sounds: Normal breath sounds. No wheezing.   Abdominal:      General: Bowel sounds are normal. There is no distension.      Palpations: Abdomen is soft.      Tenderness: There is no guarding or rebound.   Musculoskeletal:      Cervical back: Neck supple.   Lymphadenopathy:      Cervical: No cervical adenopathy.   Skin:     General: Skin is warm.      Capillary Refill: Capillary refill takes less than 2 seconds.      Findings: No rash.   Neurological:      Mental Status: He is alert and oriented to person, place, and time.      Cranial Nerves: No cranial nerve deficit.      Comments: Left-sided hemiparesis noted, poor coordination on left side, unsteady gait           Significant Labs: All pertinent labs within the past 24 hours have been reviewed.    Significant Imaging: I have reviewed all pertinent imaging results/findings within the past 24 hours.    Assessment/Plan:     37-year-old came with 2 day history of left-sided hemiparesis, gait imbalance found to have right basal ganglia stroke on CT scan.  Out of tPA window    Active Hospital Problems    Diagnosis  POA    *Right-sided basal ganglia stroke-out of tPA window [I63.9]  Yes    Left-sided Hemiparesis [G81.90]  Yes    Marijuana smoker [F12.90]  Yes      Resolved Hospital Problems   No resolved problems to display.       Plan:  Admit to telemetry-inpatient  Patient was not a tPA candidate as symptoms started more than 2 days ago.  Currently has left-sided hemiparesis, coordination is poor on left side, gait imbalance  Aspirin, statin  Frequent neuro checks  Neurology consulted  MRI, MRA, carotid ultrasound, 2D echo with bubble study  Telemetry monitoring  PT, OT, SLP.  Patient passed bedside swallow evaluation, continue cardiac diet  May need hypercoagulable workup  Check urine drug screen, Counseled on marijuana smoking    VTE Risk Mitigation  (From admission, onward)         Ordered     IP VTE LOW RISK PATIENT  Once         03/24/23 2325     Place sequential compression device  Until discontinued         03/24/23 4764                           Brianna Awan MD  Department of Hospital Medicine  Counts include 234 beds at the [facility]

## 2023-03-26 PROBLEM — I63.9 CVA (CEREBRAL VASCULAR ACCIDENT): Status: RESOLVED | Noted: 2023-03-24 | Resolved: 2023-03-26

## 2023-03-26 PROBLEM — G35 MULTIPLE SCLEROSIS WITH ACUTE NEUROLOGIC EVENT: Status: ACTIVE | Noted: 2023-03-26

## 2023-03-26 LAB
25(OH)D3+25(OH)D2 SERPL-MCNC: 13 NG/ML (ref 30–96)
ALBUMIN SERPL BCP-MCNC: 4.4 G/DL (ref 3.5–5.2)
ALBUMIN SERPL BCP-MCNC: 4.5 G/DL (ref 3.5–5.2)
ALP SERPL-CCNC: 44 U/L (ref 55–135)
ALP SERPL-CCNC: 49 U/L (ref 55–135)
ALT SERPL W/O P-5'-P-CCNC: 16 U/L (ref 10–44)
ALT SERPL W/O P-5'-P-CCNC: 17 U/L (ref 10–44)
ANION GAP SERPL CALC-SCNC: 9 MMOL/L (ref 8–16)
AST SERPL-CCNC: 19 U/L (ref 10–40)
AST SERPL-CCNC: 22 U/L (ref 10–40)
BASOPHILS # BLD AUTO: 0.02 K/UL (ref 0–0.2)
BASOPHILS NFR BLD: 0.2 % (ref 0–1.9)
BILIRUB DIRECT SERPL-MCNC: 0.1 MG/DL (ref 0.1–0.3)
BILIRUB SERPL-MCNC: 0.7 MG/DL (ref 0.1–1)
BILIRUB SERPL-MCNC: 0.7 MG/DL (ref 0.1–1)
BLOOD COLLECTION FOR MS PROFILE: NORMAL
BUN SERPL-MCNC: 7 MG/DL (ref 6–20)
CALCIUM SERPL-MCNC: 9.4 MG/DL (ref 8.7–10.5)
CHLORIDE SERPL-SCNC: 105 MMOL/L (ref 95–110)
CO2 SERPL-SCNC: 26 MMOL/L (ref 23–29)
CREAT SERPL-MCNC: 0.8 MG/DL (ref 0.5–1.4)
CRP SERPL-MCNC: 0.04 MG/DL
DIFFERENTIAL METHOD: NORMAL
EOSINOPHIL # BLD AUTO: 0.2 K/UL (ref 0–0.5)
EOSINOPHIL NFR BLD: 2.1 % (ref 0–8)
ERYTHROCYTE [DISTWIDTH] IN BLOOD BY AUTOMATED COUNT: 12.4 % (ref 11.5–14.5)
ERYTHROCYTE [SEDIMENTATION RATE] IN BLOOD BY WESTERGREN METHOD: 3 MM/HR (ref 0–10)
EST. GFR  (NO RACE VARIABLE): >60 ML/MIN/1.73 M^2
FOLATE SERPL-MCNC: 13.6 NG/ML (ref 4–24)
GLUCOSE SERPL-MCNC: 102 MG/DL (ref 70–110)
HCT VFR BLD AUTO: 46.4 % (ref 40–54)
HGB BLD-MCNC: 15.6 G/DL (ref 14–18)
IMM GRANULOCYTES # BLD AUTO: 0.02 K/UL (ref 0–0.04)
IMM GRANULOCYTES NFR BLD AUTO: 0.2 % (ref 0–0.5)
LYMPHOCYTES # BLD AUTO: 2.4 K/UL (ref 1–4.8)
LYMPHOCYTES NFR BLD: 28.9 % (ref 18–48)
MCH RBC QN AUTO: 30.7 PG (ref 27–31)
MCHC RBC AUTO-ENTMCNC: 33.6 G/DL (ref 32–36)
MCV RBC AUTO: 91 FL (ref 82–98)
MONOCYTES # BLD AUTO: 0.6 K/UL (ref 0.3–1)
MONOCYTES NFR BLD: 6.7 % (ref 4–15)
NEUTROPHILS # BLD AUTO: 5.1 K/UL (ref 1.8–7.7)
NEUTROPHILS NFR BLD: 61.9 % (ref 38–73)
NRBC BLD-RTO: 0 /100 WBC
PLATELET # BLD AUTO: 202 K/UL (ref 150–450)
PMV BLD AUTO: 10.4 FL (ref 9.2–12.9)
POTASSIUM SERPL-SCNC: 3.6 MMOL/L (ref 3.5–5.1)
PROT SERPL-MCNC: 6.9 G/DL (ref 6–8.4)
PROT SERPL-MCNC: 7.3 G/DL (ref 6–8.4)
RBC # BLD AUTO: 5.08 M/UL (ref 4.6–6.2)
SODIUM SERPL-SCNC: 140 MMOL/L (ref 136–145)
TSH SERPL DL<=0.005 MIU/L-ACNC: 0.5 UIU/ML (ref 0.34–5.6)
VIT B12 SERPL-MCNC: 499 PG/ML (ref 210–950)
WBC # BLD AUTO: 8.24 K/UL (ref 3.9–12.7)

## 2023-03-26 PROCEDURE — 25000003 PHARM REV CODE 250: Performed by: HOSPITALIST

## 2023-03-26 PROCEDURE — 36415 COLL VENOUS BLD VENIPUNCTURE: CPT | Performed by: HOSPITALIST

## 2023-03-26 PROCEDURE — 84443 ASSAY THYROID STIM HORMONE: CPT | Performed by: INTERNAL MEDICINE

## 2023-03-26 PROCEDURE — 85651 RBC SED RATE NONAUTOMATED: CPT | Performed by: INTERNAL MEDICINE

## 2023-03-26 PROCEDURE — 86038 ANTINUCLEAR ANTIBODIES: CPT | Performed by: INTERNAL MEDICINE

## 2023-03-26 PROCEDURE — 86037 ANCA TITER EACH ANTIBODY: CPT | Mod: 59 | Performed by: INTERNAL MEDICINE

## 2023-03-26 PROCEDURE — 86706 HEP B SURFACE ANTIBODY: CPT | Performed by: INTERNAL MEDICINE

## 2023-03-26 PROCEDURE — 36415 COLL VENOUS BLD VENIPUNCTURE: CPT | Performed by: INTERNAL MEDICINE

## 2023-03-26 PROCEDURE — 82525 ASSAY OF COPPER: CPT | Performed by: INTERNAL MEDICINE

## 2023-03-26 PROCEDURE — 97116 GAIT TRAINING THERAPY: CPT

## 2023-03-26 PROCEDURE — 21400001 HC TELEMETRY ROOM

## 2023-03-26 PROCEDURE — 85025 COMPLETE CBC W/AUTO DIFF WBC: CPT | Performed by: HOSPITALIST

## 2023-03-26 PROCEDURE — 86140 C-REACTIVE PROTEIN: CPT | Performed by: INTERNAL MEDICINE

## 2023-03-26 PROCEDURE — 87522 HEPATITIS C REVRS TRNSCRPJ: CPT | Performed by: INTERNAL MEDICINE

## 2023-03-26 PROCEDURE — 84446 ASSAY OF VITAMIN E: CPT | Performed by: INTERNAL MEDICINE

## 2023-03-26 PROCEDURE — 82306 VITAMIN D 25 HYDROXY: CPT | Performed by: INTERNAL MEDICINE

## 2023-03-26 PROCEDURE — 87389 HIV-1 AG W/HIV-1&-2 AB AG IA: CPT | Performed by: INTERNAL MEDICINE

## 2023-03-26 PROCEDURE — 80053 COMPREHEN METABOLIC PANEL: CPT | Performed by: HOSPITALIST

## 2023-03-26 PROCEDURE — 63600175 PHARM REV CODE 636 W HCPCS: Performed by: INTERNAL MEDICINE

## 2023-03-26 PROCEDURE — 82607 VITAMIN B-12: CPT | Performed by: INTERNAL MEDICINE

## 2023-03-26 PROCEDURE — 25000003 PHARM REV CODE 250: Performed by: INTERNAL MEDICINE

## 2023-03-26 PROCEDURE — 86592 SYPHILIS TEST NON-TREP QUAL: CPT | Performed by: INTERNAL MEDICINE

## 2023-03-26 PROCEDURE — 82746 ASSAY OF FOLIC ACID SERUM: CPT | Performed by: INTERNAL MEDICINE

## 2023-03-26 PROCEDURE — 87340 HEPATITIS B SURFACE AG IA: CPT | Performed by: INTERNAL MEDICINE

## 2023-03-26 PROCEDURE — 86803 HEPATITIS C AB TEST: CPT | Performed by: INTERNAL MEDICINE

## 2023-03-26 PROCEDURE — 80076 HEPATIC FUNCTION PANEL: CPT | Performed by: INTERNAL MEDICINE

## 2023-03-26 RX ORDER — CHOLECALCIFEROL (VITAMIN D3) 50 MCG
2000 TABLET ORAL DAILY
Status: DISCONTINUED | OUTPATIENT
Start: 2023-03-26 | End: 2023-03-27

## 2023-03-26 RX ORDER — PANTOPRAZOLE SODIUM 40 MG/1
40 TABLET, DELAYED RELEASE ORAL DAILY
Status: DISCONTINUED | OUTPATIENT
Start: 2023-03-26 | End: 2023-03-28 | Stop reason: HOSPADM

## 2023-03-26 RX ADMIN — PANTOPRAZOLE SODIUM 40 MG: 40 TABLET, DELAYED RELEASE ORAL at 09:03

## 2023-03-26 RX ADMIN — ASPIRIN 81 MG: 81 TABLET, COATED ORAL at 09:03

## 2023-03-26 RX ADMIN — Medication 2000 UNITS: at 02:03

## 2023-03-26 RX ADMIN — METHYLPREDNISOLONE SODIUM SUCCINATE 500 MG: 500 INJECTION INTRAMUSCULAR; INTRAVENOUS at 09:03

## 2023-03-26 RX ADMIN — ATORVASTATIN CALCIUM 40 MG: 40 TABLET, FILM COATED ORAL at 08:03

## 2023-03-26 RX ADMIN — METHYLPREDNISOLONE SODIUM SUCCINATE 500 MG: 500 INJECTION INTRAMUSCULAR; INTRAVENOUS at 08:03

## 2023-03-26 NOTE — PT/OT/SLP PROGRESS
Physical Therapy Treatment    Patient Name:  Brennan Shah   MRN:  [id number]    Recommendations:     Discharge Recommendations: rehabilitation facility  Discharge Equipment Recommendations:  (TBD at next level of care)  Barriers to discharge: None    Assessment:     Brennan Shah is a 37 y.o. male admitted with a medical diagnosis of Multiple sclerosis with acute neurologic event.  He presents with the following impairments/functional limitations: weakness, impaired endurance, impaired functional mobility, gait instability, impaired balance, decreased lower extremity function, decreased upper extremity function, decreased safety awareness. Patient is agreeable to participation with PT treatment. He reports feeling fatigued today due to poor sleep. He requires SBA for supine to sit and CGA for sit to stand with VC for sequencing. He ambulated 32' with no AD, Min-ModA, narrow JULIENNE, ataxia, and left quad instability resulting in knee hyperextension. Patient with decreased ambulatory distance and increased level of assistance with gait today versus yesterday.  Based on the patient's progress with physical therapy, motivation to participate in treatment, and prior level of function he is an excellent candidate for inpatient rehabilitation and he would benefit from admission to inpatient rehab to maximize his functional potential.     Rehab Prognosis: Good; patient would benefit from acute skilled PT services to address these deficits and reach maximum level of function.    Recent Surgery: * No surgery found *      Plan:     During this hospitalization, patient to be seen daily to address the identified rehab impairments via gait training, therapeutic activities, therapeutic exercises, neuromuscular re-education and progress toward the following goals:    Plan of Care Expires:  04/25/23    Subjective     Chief Complaint: fatigue  Patient/Family Comments/goals: agrees to IRF versus OPPT   Pain/Comfort:  Pain Rating 1:  0/10      Objective:     Communicated with RN prior to session.  Patient found HOB elevated with telemetry upon PT entry to room.     General Precautions: Standard, fall  Orthopedic Precautions: N/A  Braces: N/A  Respiratory Status: Room air     Functional Mobility:  Bed Mobility:     Supine to Sit: stand by assistance  Transfers:     Sit to Stand:  contact guard assistance with no AD  Gait: 32' with no AD, Min-ModA, narrow JULIENNE, ataxia, and left quad instability resulting in knee hyperextension      AM-PAC 6 CLICK MOBILITY  Sitting down on and standing up from a chair with arms (e.g., wheelchair, bedside commode, etc.): 4  Moving from lying on back to sitting on the side of the bed?: 4  Moving to and from a bed to a chair (including a wheelchair)?: 3  Need to walk in hospital room?: 2  Climbing 3-5 steps with a railing?: 2       Treatment & Education:  Patient was educated on the importance of OOB activity and functional mobility to negate negative effects of prolonged bed rest during hospitalization, safe transfers and ambulation, and D/C planning     Patient left HOB elevated with all lines intact, call button in reach, and bed alarm on..    GOALS:   Multidisciplinary Problems       Physical Therapy Goals          Problem: Physical Therapy    Goal Priority Disciplines Outcome Goal Variances Interventions   Physical Therapy Goal     PT, PT/OT Ongoing, Progressing     Description: Goals to be met by: 23     Patient will increase functional independence with mobility by performin. Supine to sit with [place]  2. Sit to stand transfer with [place]  3. Bed to chair transfer with [place] using No Assistive Device  4. Gait  x 500 feet with [place] using No Assistive Device.   5. Lower extremity exercise program x20 reps per handout, with independence                         Time Tracking:     PT Received On: 23  PT Start Time: 1132     PT Stop Time: 1142  PT Total Time (min): 10 min      Billable Minutes: Gait Training 10    Treatment Type: Treatment  PT/PTA: PT     Number of PTA visits since last PT visit: 0     03/26/2023

## 2023-03-26 NOTE — PLAN OF CARE
Problem: Physical Therapy  Goal: Physical Therapy Goal  Description: Goals to be met by: 23     Patient will increase functional independence with mobility by performin. Supine to sit with [place]  2. Sit to stand transfer with [place]  3. Bed to chair transfer with [place] using No Assistive Device  4. Gait  x 500 feet with [place] using No Assistive Device.   5. Lower extremity exercise program x20 reps per handout, with independence    Outcome: Ongoing, Progressing

## 2023-03-26 NOTE — PLAN OF CARE
Problem: Adult Inpatient Plan of Care  Goal: Absence of Hospital-Acquired Illness or Injury  Outcome: Ongoing, Progressing  Goal: Optimal Comfort and Wellbeing  Outcome: Ongoing, Progressing  Goal: Readiness for Transition of Care  Outcome: Ongoing, Progressing     Problem: Adjustment to Illness (Stroke, Ischemic/Transient Ischemic Attack)  Goal: Optimal Coping  Outcome: Ongoing, Progressing     Problem: Bowel Elimination Impaired (Stroke, Ischemic/Transient Ischemic Attack)  Goal: Effective Bowel Elimination  Outcome: Ongoing, Progressing     Problem: Cerebral Tissue Perfusion (Stroke, Ischemic/Transient Ischemic Attack)  Goal: Optimal Cerebral Tissue Perfusion  Outcome: Ongoing, Progressing     Problem: Cognitive Impairment (Stroke, Ischemic/Transient Ischemic Attack)  Goal: Optimal Cognitive Function  Outcome: Ongoing, Progressing     Problem: Communication Impairment (Stroke, Ischemic/Transient Ischemic Attack)  Goal: Improved Communication Skills  Outcome: Ongoing, Progressing     Problem: Functional Ability Impaired (Stroke, Ischemic/Transient Ischemic Attack)  Goal: Optimal Functional Ability  Outcome: Ongoing, Progressing     Problem: Respiratory Compromise (Stroke, Ischemic/Transient Ischemic Attack)  Goal: Effective Oxygenation and Ventilation  Outcome: Ongoing, Progressing     Problem: Sensorimotor Impairment (Stroke, Ischemic/Transient Ischemic Attack)  Goal: Improved Sensorimotor Function  Outcome: Ongoing, Progressing     Problem: Swallowing Impairment (Stroke, Ischemic/Transient Ischemic Attack)  Goal: Optimal Eating and Swallowing without Aspiration  Outcome: Met     Problem: Urinary Elimination Impaired (Stroke, Ischemic/Transient Ischemic Attack)  Goal: Effective Urinary Elimination  Outcome: Met

## 2023-03-26 NOTE — PROGRESS NOTES
[facility] Medicine  Progress Note    Patient Name: Brennan Shah  MRN: [id number]  Patient Class: IP- Inpatient   Admission Date: 3/24/2023  Length of Stay: 2 days  Attending Physician: Lauro Heart MD  Primary Care Provider: Primary Doctor No        Subjective:     Principal Problem:Multiple sclerosis with acute neurologic event        HPI:  37-year-old without any past medical history presented with chief complaint of left-sided weakness, imbalance.  Upon further asking patient mentioned that he was in his usual state of health until 2 days ago when he woke up his left side felt weak, he also noticed his gait was not steady.  His symptoms were progressively getting worse and he decided to come to ED. otherwise denies any fever, chills, headache, dizziness, chest pain, palpitations, nausea, vomiting, bladder or bowel symptoms.  He denies any prior history of CVA, CAD, hypertension.  Patient's mother has 3 strokes however she has multiple comorbidities including hypertension.     In ED patient was found to have left-sided motor weakness, coordination deficit, gait imbalance.  NIH of at least 4.  Hospital medicine was consulted for further workup.       Overview/Hospital Course:  No notes on file    Interval History:  further workup is less consistent with CVA and appears to be Multiple Sclerosis. Treating with IV steroids. He says he feels slightly better today but still weak    Review of Systems   All other systems reviewed and are negative.  Objective:     Vital Signs (Most Recent):  Temp: 97.8 °F (36.6 °C) (03/26/23 1143)  Pulse: 70 (03/26/23 1143)  Resp: 18 (03/26/23 1143)  BP: (!) 105/56 (03/26/23 1143)  SpO2: 99 % (03/26/23 1143)   Vital Signs (24h Range):  Temp:  [97.7 °F (36.5 °C)-98.1 °F (36.7 °C)] 97.8 °F (36.6 °C)  Pulse:  [65-90] 70  Resp:  [18-20] 18  SpO2:  [96 %-99 %] 99 %  BP: ()/(56-67) 105/56     Weight: 71.7 kg (158 lb 1.6 oz)  Body mass index is 20.86  kg/m².    Intake/Output Summary (Last 24 hours) at 3/26/2023 1244  Last data filed at 3/26/2023 1033  Gross per 24 hour   Intake 840 ml   Output 1150 ml   Net -310 ml        Physical Exam  Vitals reviewed.   Constitutional:       Appearance: Normal appearance.   HENT:      Head: Normocephalic and atraumatic.      Nose: Nose normal.      Mouth/Throat:      Mouth: Mucous membranes are moist.   Eyes:      Pupils: Pupils are equal, round, and reactive to light.   Cardiovascular:      Rate and Rhythm: Normal rate and regular rhythm.      Pulses: Normal pulses.      Heart sounds: Normal heart sounds. No murmur heard.    No friction rub. No gallop.   Pulmonary:      Effort: Pulmonary effort is normal. No respiratory distress.      Breath sounds: Normal breath sounds.   Abdominal:      General: Abdomen is flat. Bowel sounds are normal. There is no distension.      Palpations: Abdomen is soft.      Tenderness: There is no abdominal tenderness.   Musculoskeletal:         General: No swelling. Normal range of motion.      Cervical back: Normal range of motion and neck supple.   Skin:     General: Skin is warm and dry.   Neurological:      Mental Status: He is alert and oriented to person, place, and time.      Comments: Mild residual left-sided weakness   Psychiatric:         Mood and Affect: Mood normal.         Behavior: Behavior normal.         Thought Content: Thought content normal.         Judgment: Judgment normal.       Significant Labs: All pertinent labs within the past 24 hours have been reviewed.    Significant Imaging: I have reviewed all pertinent imaging results/findings within the past 24 hours.      Assessment/Plan:      * Multiple sclerosis with acute neurologic event  - Pt appears to have MS based on neurological imaging findings and symptoms  - continue steroids IV per Dr Courtney  - continue ASA and statin for now  - Appreciate Dr Courtney's recommendations  - MS panel sent      Marijuana smoker        Left-sided  Hemiparesis  Due to MS lesions most likely  PT and OT working with him  May benefit from rehab        VTE Risk Mitigation (From admission, onward)           Ordered     IP VTE LOW RISK PATIENT  Once         03/24/23 2325     Place sequential compression device  Until discontinued         03/24/23 2325                    Discharge Planning   ASHER:      Code Status: Full Code   Is the patient medically ready for discharge?:     Reason for patient still in hospital (select all that apply): Treatment  Discharge Plan A: Home with family                  Lauro Heart MD  Department of Hospital Medicine   [facility]

## 2023-03-26 NOTE — SUBJECTIVE & OBJECTIVE
Interval History:  further workup is less consistent with CVA and appears to be Multiple Sclerosis. Treating with IV steroids. He says he feels slightly better today but still weak    Review of Systems   All other systems reviewed and are negative.  Objective:     Vital Signs (Most Recent):  Temp: 97.8 °F (36.6 °C) (03/26/23 1143)  Pulse: 70 (03/26/23 1143)  Resp: 18 (03/26/23 1143)  BP: (!) 105/56 (03/26/23 1143)  SpO2: 99 % (03/26/23 1143)   Vital Signs (24h Range):  Temp:  [97.7 °F (36.5 °C)-98.1 °F (36.7 °C)] 97.8 °F (36.6 °C)  Pulse:  [65-90] 70  Resp:  [18-20] 18  SpO2:  [96 %-99 %] 99 %  BP: ()/(56-67) 105/56     Weight: 71.7 kg (158 lb 1.6 oz)  Body mass index is 20.86 kg/m².    Intake/Output Summary (Last 24 hours) at 3/26/2023 1244  Last data filed at 3/26/2023 1033  Gross per 24 hour   Intake 840 ml   Output 1150 ml   Net -310 ml        Physical Exam  Vitals reviewed.   Constitutional:       Appearance: Normal appearance.   HENT:      Head: Normocephalic and atraumatic.      Nose: Nose normal.      Mouth/Throat:      Mouth: Mucous membranes are moist.   Eyes:      Pupils: Pupils are equal, round, and reactive to light.   Cardiovascular:      Rate and Rhythm: Normal rate and regular rhythm.      Pulses: Normal pulses.      Heart sounds: Normal heart sounds. No murmur heard.    No friction rub. No gallop.   Pulmonary:      Effort: Pulmonary effort is normal. No respiratory distress.      Breath sounds: Normal breath sounds.   Abdominal:      General: Abdomen is flat. Bowel sounds are normal. There is no distension.      Palpations: Abdomen is soft.      Tenderness: There is no abdominal tenderness.   Musculoskeletal:         General: No swelling. Normal range of motion.      Cervical back: Normal range of motion and neck supple.   Skin:     General: Skin is warm and dry.   Neurological:      Mental Status: He is alert and oriented to person, place, and time.      Comments: Mild residual left-sided  weakness   Psychiatric:         Mood and Affect: Mood normal.         Behavior: Behavior normal.         Thought Content: Thought content normal.         Judgment: Judgment normal.       Significant Labs: All pertinent labs within the past 24 hours have been reviewed.    Significant Imaging: I have reviewed all pertinent imaging results/findings within the past 24 hours.

## 2023-03-26 NOTE — PLAN OF CARE
Problem: Adult Inpatient Plan of Care  Goal: Plan of Care Review  Outcome: Ongoing, Progressing  Goal: Patient-Specific Goal (Individualized)  Outcome: Ongoing, Progressing  Goal: Absence of Hospital-Acquired Illness or Injury  Outcome: Ongoing, Progressing  Goal: Optimal Comfort and Wellbeing  Outcome: Ongoing, Progressing  Goal: Readiness for Transition of Care  Outcome: Ongoing, Progressing     Problem: Adjustment to Illness (Stroke, Ischemic/Transient Ischemic Attack)  Goal: Optimal Coping  Outcome: Ongoing, Progressing     Problem: Bowel Elimination Impaired (Stroke, Ischemic/Transient Ischemic Attack)  Goal: Effective Bowel Elimination  Outcome: Ongoing, Progressing     Problem: Cerebral Tissue Perfusion (Stroke, Ischemic/Transient Ischemic Attack)  Goal: Optimal Cerebral Tissue Perfusion  Outcome: Ongoing, Progressing     Problem: Cognitive Impairment (Stroke, Ischemic/Transient Ischemic Attack)  Goal: Optimal Cognitive Function  Outcome: Ongoing, Progressing     Problem: Communication Impairment (Stroke, Ischemic/Transient Ischemic Attack)  Goal: Improved Communication Skills  Outcome: Ongoing, Progressing     Problem: Functional Ability Impaired (Stroke, Ischemic/Transient Ischemic Attack)  Goal: Optimal Functional Ability  Outcome: Ongoing, Progressing     Problem: Respiratory Compromise (Stroke, Ischemic/Transient Ischemic Attack)  Goal: Effective Oxygenation and Ventilation  Outcome: Ongoing, Progressing     Problem: Sensorimotor Impairment (Stroke, Ischemic/Transient Ischemic Attack)  Goal: Improved Sensorimotor Function  Outcome: Ongoing, Progressing

## 2023-03-26 NOTE — PROGRESS NOTES
[facility]  Department of Neurology  Progress Note  Date: 2023 10:04 AM          Patient Name: Brennan Shah   MRN: [id number]   : 1985    AGE: 37 y.o.    LOS: 2 days Hospital Day: 3  Admit date: 3/24/2023  8:23 PM          HPI per EMR: Brennan Shah is a 37 y.o. male without any past medical history presented with chief complaint of left-sided weakness, imbalance.  Upon further asking patient mentioned that he was in his usual state of health until 2 days ago when he woke up his left side felt weak, he also noticed his gait was not steady.  His symptoms were progressively getting worse and he decided to come to ED. otherwise denies any fever, chills, headache, dizziness, chest pain, palpitations, nausea, vomiting, bladder or bowel symptoms.  He denies any prior history of CVA, CAD, hypertension.  Patient's mother has 3 strokes however she has multiple comorbidities including hypertension.      Neurology consult:  Patient was seen examined by me this morning.  He is alert, oriented x3.  He states that the past 2 days, he has had left upper and lower extremity weakness and difficulty walking due to weakness in the left lower extremity.  He denies any slurred speech, vision trouble, nausea vomiting, sensory changes.  He smokes about 1 pack of cigarettes every 2-3 days occasionally drinks alcohol.  He has a family history of stroke his aunt who had her 1st stroke at age of 46.    2023: No acute events overnight. Patient was seen and examined by me this morning. Neuro exam with mild left upper and lower extremity weakness otherwise normal.  MRI brain with contrast enhancement of the right basal ganglia lesion and also noted lesion in the MRI C-spine       Vitals:  Patient Vitals for the past 24 hrs:   BP Temp Temp src Pulse Resp SpO2   23 0818 97/63 97.7 °F (36.5 °C) Oral 65 18 96 %   23 0340 (!) 101/58 98.1 °F (36.7 °C) Oral 76 20 99 %   23 2330 110/62 97.9 °F (36.6  °C) Oral 75 18 98 %   03/25/23 1905 105/65 98 °F (36.7 °C) Oral 90 20 99 %   03/25/23 1539 107/67 98.1 °F (36.7 °C) Oral 75 18 98 %   03/25/23 1131 (!) 94/54 98.4 °F (36.9 °C) Oral 63 18 99 %     PHYSICAL EXAM:     GENERAL APPEARANCE: Well-developed, well-nourished male in no acute distress.  HEENT: Normocephalic and atraumatic. PERRL. Oropharynx unremarkable.  PULM: Comfortable on room air.  CV: RRR.  ABDOMEN: Soft, nontender.  EXTREMITIES: No signs of vascular compromise. Pulses present. No cyanosis, clubbing or edema.  SKIN: Clear; no rashes, lesions or skin breaks in exposed areas.      NEURO:   MENTAL STATUS: Patient awake and oriented to time, place, and person. Affect normal.  CRANIAL NERVES II-XII: Pupils equal, round and reactive to light. Extraocular movements full and intact. No facial asymmetry.  MOTOR: Normal bulk. Tone normal and symmetrical throughout.  No abnormal movements. No tremor.   Strength 5/5 right upper and lower extremity, 4+/5 left upper and lower extremity.   REFLEXES: DTRs 2+; normal and symmetric throughout.   SENSATION: Sensation grossly intact to fine touch.  COORDINATION: Finger-to-nose normal for age and symmetric.  STATION: Romberg deferred.  GAIT: Deferred.    CURRENT SCHEDULED MEDICATIONS:   aspirin  81 mg Oral Daily    atorvastatin  40 mg Oral QHS    methylPREDNISolone sodium succinate injection  500 mg Intravenous Q12H    pantoprazole  40 mg Oral Daily     CURRENT INFUSIONS:    DATA:  Recent Labs   Lab 03/24/23 2128 03/25/23 0452 03/26/23 0433    137 140   K 4.0 3.9 3.6    105 105   CO2 29 26 26   BUN 9 12 7   CREATININE 1.1 0.9 0.8    108 102   CALCIUM 9.5 9.1 9.4   PHOS  --  3.8  --    MG  --  1.8  --    AST 19 19 19   ALT 13 15 17     Recent Labs   Lab 03/24/23 2128 03/25/23 0452 03/26/23  0433   WBC 6.64 7.12 8.24   HGB 15.2 14.8 15.6   HCT 47.1 44.6 46.4    186 202     No results found for: PROTEINCSF, GLUCCSF, WBCCSF, RBCCSF,  PMNCSF  Hemoglobin A1C   Date Value Ref Range Status   03/25/2023 5.8 4.5 - 6.2 % Final     Comment:     According to ADA guidelines, hemoglobin A1C <7.0% represents  optimal control in non-pregnant diabetic patients.  Different  metrics may apply to specific populations.   Standards of Medical Care in Diabetes - 2016.    For the purpose of screening for the presence of diabetes:  <5.7%     Consistent with the absence of diabetes  5.7-6.4%  Consistent with increasing risk for diabetes   (prediabetes)  >or=6.5%  Consistent with diabetes    Currently no consensus exists for use of hemoglobin A1C  for diagnosis of diabetes for children.              I have personally reviewed and interpreted the pertinent imaging and lab results.  Imaging Results              US Carotid Bilateral (Final result)  Result time 03/25/23 04:12:52      Final result by Paxton Jones DO (03/25/23 04:12:52)                   Narrative:    CLINICAL HISTORY:  CVA    EXAMINATION:  US CAROTID BILATERAL    COMPARISON:  No comparison.    FINDINGS:  Peak systolic velocities are listed below (all values in cm/s).    RIGHT  - CCA PROX PSV     -  - CCA MID PSV         103  - CCA DIST PSV       -  - ICA P PSV              55  - ICA M PSV             86  - ICA D PSV              73  - ECA                        93  - VERT                      71  antegrade.  - ICA/CCA                 0.8    LEFT  - CCA PROX PSV     -  - CCA MID PS           115  - CCA DIST PSV       -  - ICA P PSV               88  - ICA M PSV               77  - ICA D PSV               68  - ECA                         38  - VERT                       70  antegrade.  - ICA/CCA                  0.8      CAROTID STENOSIS REFERENCE USING SRU CRITERIA:  Mild - <50% stenosis. ICA PSV is less than 125 cm/second and plaque or intimal thickening is visible.    Moderate - 50-69% stenosis. ICA PSV is 125 to 230 cm/second and plaque is visible.    Severe - 70-94% stenosis. ICA PSV is more than 230  cm/second and visible plaque with lumen narrowing is seen.    Near occlusion - 95-99% stenosis. ICA PSV is variable and significant plaque with luminal narrowing is seen.    Occluded - 100% stenosis. No flow identified.    IMPRESSION:  There is no significant degree of atherosclerotic change or stenosis identified within the bilateral common carotid or bilateral internal carotid arteries.    Electronically signed by:  Paxton Jones DO  3/25/2023 4:12 AM CDT Workstation: [id number]                                     CT Head Without Contrast (Final result)  Result time 03/24/23 22:57:01      Final result by Paxton Dove MD (03/24/23 22:57:01)                   Narrative:    Head CT scan without contrast    COMPARISONS: None    ADDITIONAL PERTINENT HISTORY: Acute stroke    TECHNIQUE:  Multiple axial images were obtained from the skull base to the vertex without IV contrast. One of the following dose optimization techniques was utilized in the performance of this exam: Automated exposure control; adjustment of the mA and/or kV according to the patient's size; or use of an iterative  reconstruction technique.  Specific details can be referenced in the facility's radiology CT exam operational policy.    FINDINGS:    Midline shift: Negative    Ventricles:  Negative    Brain parenchyma:  Small lacunar infarct in the right basal ganglia.    Extra-axial spaces:  Negative    Intracranial vasculature:  Negative    Osseous structures:  Negative    Paranasal sinuses and mastoid air cells:  Mild mucosal thickening involving the maxillary sinuses and ethmoid air cells    Surrounding soft tissues and orbits:  Negative      IMPRESSION:  1. Small lacunar infarct involving the right basal ganglion.  2. Underlying paranasal sinus disease.  3. No acute intracranial pathology    Electronically signed by:  Paxton Dove MD  3/24/2023 10:57 PM CDT Workstation: [id number]                                            ASSESSMENT AND PLAN:         Multiple sclerosis      Workup  CTH:  Hypodensity in the right basal ganglia  MRI brain:  DWI hyperintensity in the right basal ganglia region however there is no ADC correlate.  Likely T2 shine through.  Extensive FLAIR hyperintensity noted in the periventricular area significantly more for patient's age.  MRI brain with contrast: Post contrasted T1-weighted images demonstrate regions of enhancement within the right basal ganglia/thalamus and extending into the right cerebral peduncle  MRA head:  Normal MR angio brain  MRI C spine: There are 2 foci of abnormal signal within the cervical spinal cord at C3 and C4. Given the findings seen within the brain, these are concerning for demyelination plaques of MS. There is no abnormal enhancement to suggest any active demyelination.         Plan  Patient presented with sudden onset of left-sided weakness for the past 2 days.  Based on the above MRI brain, C-spine findings, patient likely has demyelinating disorder (likely MS).  Patient has dissemination in space with lesions being supratentorial and infratentorial also dissemination in time with enhancing lesion in the brain and nonenhancing lesion in the spine which qualifies for multiple sclerosis.  Likely multiple sclerosis exacerbation now.  Started on prednisone 500 mg b.i.d. for 3 days for management of multiple sclerosis exacerbation  Can hold off on aspirin given concern for stroke is low and likely diagnosis multiple sclerosis  Outpatient follow-up for disease modifying agents.  Blood work with TARAH, ESR, CRP, Anca panel, vitamin B12, vitamin-D, folate, RPR.  Started on vitamin-D supplement  PT OT  Speech therapy  DVT prophylaxis with chemo/SCD prophylaxis  Discussed lifestyle modifications as prophylactic measures for stroke prevention including smoking cessation, adequate blood pressure management, healthy diet and regular exercise.           Alexi Courtney MD  Neurology/vascular Neurology  Date of Service:  03/26/2023  10:04 AM    Please note: This note was transcribed using voice recognition software. Because of this technology there are often uinintended grammatical, spelling, and other transcription errors. Please disregard these errors.

## 2023-03-26 NOTE — ASSESSMENT & PLAN NOTE
Due to MS lesions most likely  PT and OT working with him  May benefit from rehab, but has a child due on Thursday so may limit his ability to participate in rehab

## 2023-03-26 NOTE — ASSESSMENT & PLAN NOTE
- Pt appears to have MS based on neurological imaging findings and symptoms  - continue steroids IV per Dr Courtney  - continue ASA and statin for now  - Appreciate Dr Courtney's recommendations  - MS panel sent

## 2023-03-27 LAB — RPR SER QL: NORMAL

## 2023-03-27 PROCEDURE — 25000003 PHARM REV CODE 250: Performed by: INTERNAL MEDICINE

## 2023-03-27 PROCEDURE — 97110 THERAPEUTIC EXERCISES: CPT

## 2023-03-27 PROCEDURE — 97535 SELF CARE MNGMENT TRAINING: CPT

## 2023-03-27 PROCEDURE — 21400001 HC TELEMETRY ROOM

## 2023-03-27 PROCEDURE — 63600175 PHARM REV CODE 636 W HCPCS: Performed by: INTERNAL MEDICINE

## 2023-03-27 PROCEDURE — 97116 GAIT TRAINING THERAPY: CPT

## 2023-03-27 PROCEDURE — 25000003 PHARM REV CODE 250: Performed by: STUDENT IN AN ORGANIZED HEALTH CARE EDUCATION/TRAINING PROGRAM

## 2023-03-27 PROCEDURE — 25000003 PHARM REV CODE 250: Performed by: HOSPITALIST

## 2023-03-27 RX ORDER — ACETAMINOPHEN 325 MG/1
650 TABLET ORAL EVERY 6 HOURS PRN
Status: DISCONTINUED | OUTPATIENT
Start: 2023-03-27 | End: 2023-03-28 | Stop reason: HOSPADM

## 2023-03-27 RX ORDER — CHOLECALCIFEROL (VITAMIN D3) 50 MCG
4000 TABLET ORAL DAILY
Status: DISCONTINUED | OUTPATIENT
Start: 2023-03-28 | End: 2023-03-28 | Stop reason: HOSPADM

## 2023-03-27 RX ADMIN — ATORVASTATIN CALCIUM 40 MG: 40 TABLET, FILM COATED ORAL at 09:03

## 2023-03-27 RX ADMIN — Medication 2000 UNITS: at 08:03

## 2023-03-27 RX ADMIN — PANTOPRAZOLE SODIUM 40 MG: 40 TABLET, DELAYED RELEASE ORAL at 08:03

## 2023-03-27 RX ADMIN — METHYLPREDNISOLONE SODIUM SUCCINATE 500 MG: 500 INJECTION INTRAMUSCULAR; INTRAVENOUS at 08:03

## 2023-03-27 RX ADMIN — METHYLPREDNISOLONE SODIUM SUCCINATE 500 MG: 500 INJECTION INTRAMUSCULAR; INTRAVENOUS at 09:03

## 2023-03-27 RX ADMIN — ACETAMINOPHEN 650 MG: 325 TABLET ORAL at 12:03

## 2023-03-27 NOTE — PT/OT/SLP PROGRESS
Physical Therapy Treatment    Patient Name:  Brennan Shah   MRN:  [id number]    Recommendations:     Discharge Recommendations: rehabilitation facility, outpatient PT, pt is requesting OP PT  Discharge Equipment Recommendations: walker, rolling  Barriers to discharge:  impaired    Assessment:     Brennan Shah is a 37 y.o. male admitted with a medical diagnosis of Multiple sclerosis with acute neurologic event.  He presents with the following impairments/functional limitations: weakness, impaired endurance, impaired self care skills, impaired functional mobility, gait instability, impaired balance, decreased lower extremity function, decreased upper extremity function, decreased coordination, impaired cardiopulmonary response to activity .    Rehab Prognosis: Good; patient would benefit from acute skilled PT services to address these deficits and reach maximum level of function.    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  Patient found sitting edge of bed with telemetry upon PT entry to room.     General Precautions: Standard, fall  Orthopedic Precautions: N/A  Braces: N/A  Respiratory Status: Room air     Functional Mobility:  Transfers:     Sit to Stand:  stand by assistance with hand-held assist  Gait: 80 ft x2  RW and CGA after gait of 20 ft  Min A w/o AD ataxic gait , L knee hyperextension  Balance: Min/CGA with standing w/o AD      AM-PAC 6 CLICK MOBILITY          Treatment & Education:  Educated on PT POC, safety with RW usage, L LE therex in sitting  1x10 reps of LAQ's, AP's, hip flexion    Patient left sitting edge of bed with all lines intact, call button in reach, and his mate present..    GOALS:   Multidisciplinary Problems       Physical Therapy Goals          Problem: Physical Therapy    Goal Priority Disciplines Outcome Goal Variances Interventions   Physical Therapy Goal     PT, PT/OT Ongoing, Progressing     Description: Goals to be met by: 23     Patient will increase functional independence with mobility by performin. Supine to sit with Sully  2. Sit to stand transfer with Sully  3. Bed to chair transfer with Sully using No Assistive Device  4. Gait  x 500 feet with Sully using No Assistive Device.   5. Lower extremity exercise program x20 reps per handout, with independence                         Time Tracking:     PT Received On: 23  PT Start Time: 1103     PT Stop Time: 1128  PT Total Time (min): 25 min     Billable Minutes: Gait Training 10 minutes and Therapeutic Exercise 15 minutes    Treatment Type: Treatment  PT/PTA: PT     Number of PTA visits since last PT visit: 0     2023

## 2023-03-27 NOTE — PLAN OF CARE
met with Pt at bedside to discuss PT/OT recommendation.  delivered list of rehab hospitals in Pt's zip code. Pt inquired about rehab hospital in [place] (referring to [facility]).  provided list with [facility] rehab included. Pt requested  return later to discuss discharge plan.      03/27/23 1117   Discharge Reassessment   Assessment Type Discharge Planning Reassessment   Did the patient's condition or plan change since previous assessment? Yes   Discharge Plan discussed with: Patient   Communicated ASHER with patient/caregiver Yes   Discharge Plan A Rehab   Discharge Plan B Home with family   DME Needed Upon Discharge  none   Discharge Barriers Identified None   Post-Acute Status   Coverage Payor:  [employer] - [employer] ALL OUT OF STATE   Patient choice form signed by patient/caregiver List from [employer]   Discharge Delays None known at this time        No

## 2023-03-27 NOTE — PROGRESS NOTES
[facility]  Department of Neurology  Progress Note  Date: 2023           Patient Name: Brennan Shah   MRN: [id number]   : 1985    AGE: 37 y.o.  LOS: 3 days Hospital Day: 4  Admit date: 3/24/2023  8:23 PM        HPI per EMR: Brennan Shah is a 37 y.o. male without any past medical history presented with chief complaint of left-sided weakness, imbalance.  Upon further asking patient mentioned that he was in his usual state of health until 2 days ago when he woke up his left side felt weak, he also noticed his gait was not steady.  His symptoms were progressively getting worse and he decided to come to ED. otherwise denies any fever, chills, headache, dizziness, chest pain, palpitations, nausea, vomiting, bladder or bowel symptoms.  He denies any prior history of CVA, CAD, hypertension.  Patient's mother has 3 strokes however she has multiple comorbidities including hypertension.      Neurology consult:  Patient was seen examined by me this morning.  He is alert, oriented x3.  He states that the past 2 days, he has had left upper and lower extremity weakness and difficulty walking due to weakness in the left lower extremity.  He denies any slurred speech, vision trouble, nausea vomiting, sensory changes.  He smokes about 1 pack of cigarettes every 2-3 days occasionally drinks alcohol.  He has a family history of stroke his aunt who had her 1st stroke at age of 46.    2023: No acute events overnight. Patient was seen and examined by me this morning. Neuro exam with mild left upper and lower extremity weakness otherwise normal.  MRI brain with contrast enhancement of the right basal ganglia lesion and also noted lesion in the MRI C-spine    23: Patient was seen and examined by me today. He reports improvement of his weakness with steroids, and denies any new neuro deficits. Has been walking in the hallway several times a day.       Vitals:  Patient Vitals for the past 24 hrs:   BP  Temp Temp src Pulse Resp SpO2 Weight   03/27/23 0808 114/63 97.7 °F (36.5 °C) Oral 73 18 97 % --   03/27/23 0552 97/71 97.3 °F (36.3 °C) Oral 79 17 96 % 75 kg (165 lb 5.5 oz)   03/27/23 0005 111/63 98.1 °F (36.7 °C) Oral 83 18 97 % --   03/26/23 2014 107/72 98.1 °F (36.7 °C) Oral (!) 114 18 99 % --   03/26/23 1632 115/65 98.1 °F (36.7 °C) Oral 104 18 97 % --   03/26/23 1143 (!) 105/56 97.8 °F (36.6 °C) Oral 70 18 99 % --       PHYSICAL EXAM:     GENERAL APPEARANCE: Well-developed, well-nourished male in no acute distress.  HEENT: Normocephalic and atraumatic. PERRL. Oropharynx unremarkable.  PULM: Comfortable on room air.  CV: RRR.  ABDOMEN: Soft, nontender.  EXTREMITIES: No signs of vascular compromise. Pulses present. No cyanosis, clubbing or edema.  SKIN: Clear; no rashes, lesions or skin breaks in exposed areas.      NEURO:   MENTAL STATUS: Patient awake and oriented to time, place, and person. Affect normal.  CRANIAL NERVES II-XII: Pupils equal, round and reactive to light. Extraocular movements full and intact. No facial asymmetry.  MOTOR: Normal bulk. Tone normal and symmetrical throughout.  No abnormal movements. No tremor.   Strength 5/5 right upper and lower extremity, 4+/5 left upper and lower extremity.   REFLEXES: DTRs 2+; normal and symmetric throughout.   SENSATION: Sensation grossly intact to fine touch.  COORDINATION: Finger-to-nose normal for age and symmetric.  STATION: Romberg deferred.  GAIT: Deferred.    CURRENT SCHEDULED MEDICATIONS:   atorvastatin  40 mg Oral QHS    cholecalciferol (vitamin D3)  2,000 Units Oral Daily    methylPREDNISolone sodium succinate injection  500 mg Intravenous Q12H    pantoprazole  40 mg Oral Daily     CURRENT INFUSIONS:    DATA:  Recent Labs   Lab 03/24/23  2128 03/25/23  0452 03/26/23  0433 03/26/23  1434    137 140  --    K 4.0 3.9 3.6  --     105 105  --    CO2 29 26 26  --    BUN 9 12 7  --    CREATININE 1.1 0.9 0.8  --     108 102  --     CALCIUM 9.5 9.1 9.4  --    PHOS  --  3.8  --   --    MG  --  1.8  --   --    AST 19 19 19 22   ALT 13 15 17 16       Recent Labs   Lab 03/24/23 2128 03/25/23 0452 03/26/23  0433   WBC 6.64 7.12 8.24   HGB 15.2 14.8 15.6   HCT 47.1 44.6 46.4    186 202       No results found for: PROTEINCSF, GLUCCSF, WBCCSF, RBCCSF, PMNCSF  Hemoglobin A1C   Date Value Ref Range Status   03/25/2023 5.8 4.5 - 6.2 % Final     Comment:     According to ADA guidelines, hemoglobin A1C <7.0% represents  optimal control in non-pregnant diabetic patients.  Different  metrics may apply to specific populations.   Standards of Medical Care in Diabetes - 2016.    For the purpose of screening for the presence of diabetes:  <5.7%     Consistent with the absence of diabetes  5.7-6.4%  Consistent with increasing risk for diabetes   (prediabetes)  >or=6.5%  Consistent with diabetes    Currently no consensus exists for use of hemoglobin A1C  for diagnosis of diabetes for children.                I have personally reviewed and interpreted the pertinent imaging and lab results.  Imaging Results              US Carotid Bilateral (Final result)  Result time 03/25/23 04:12:52      Final result by Paxton Jones DO (03/25/23 04:12:52)                   Narrative:    CLINICAL HISTORY:  CVA    EXAMINATION:  US CAROTID BILATERAL    COMPARISON:  No comparison.    FINDINGS:  Peak systolic velocities are listed below (all values in cm/s).    RIGHT  - CCA PROX PSV     -  - CCA MID PSV         103  - CCA DIST PSV       -  - ICA P PSV              55  - ICA M PSV             86  - ICA D PSV              73  - ECA                        93  - VERT                      71  antegrade.  - ICA/CCA                 0.8    LEFT  - CCA PROX PSV     -  - CCA MID PS           115  - CCA DIST PSV       -  - ICA P PSV               88  - ICA M PSV               77  - ICA D PSV               68  - ECA                         38  - VERT                       70   antegrade.  - ICA/CCA                  0.8      CAROTID STENOSIS REFERENCE USING SRU CRITERIA:  Mild - <50% stenosis. ICA PSV is less than 125 cm/second and plaque or intimal thickening is visible.    Moderate - 50-69% stenosis. ICA PSV is 125 to 230 cm/second and plaque is visible.    Severe - 70-94% stenosis. ICA PSV is more than 230 cm/second and visible plaque with lumen narrowing is seen.    Near occlusion - 95-99% stenosis. ICA PSV is variable and significant plaque with luminal narrowing is seen.    Occluded - 100% stenosis. No flow identified.    IMPRESSION:  There is no significant degree of atherosclerotic change or stenosis identified within the bilateral common carotid or bilateral internal carotid arteries.    Electronically signed by:  Paxton Jones DO  3/25/2023 4:12 AM CDT Workstation: [id number]                                     CT Head Without Contrast (Final result)  Result time 03/24/23 22:57:01      Final result by Paxton Dove MD (03/24/23 22:57:01)                   Narrative:    Head CT scan without contrast    COMPARISONS: None    ADDITIONAL PERTINENT HISTORY: Acute stroke    TECHNIQUE:  Multiple axial images were obtained from the skull base to the vertex without IV contrast. One of the following dose optimization techniques was utilized in the performance of this exam: Automated exposure control; adjustment of the mA and/or kV according to the patient's size; or use of an iterative  reconstruction technique.  Specific details can be referenced in the facility's radiology CT exam operational policy.    FINDINGS:    Midline shift: Negative    Ventricles:  Negative    Brain parenchyma:  Small lacunar infarct in the right basal ganglia.    Extra-axial spaces:  Negative    Intracranial vasculature:  Negative    Osseous structures:  Negative    Paranasal sinuses and mastoid air cells:  Mild mucosal thickening involving the maxillary sinuses and ethmoid air cells    Surrounding soft tissues  and orbits:  Negative      IMPRESSION:  1. Small lacunar infarct involving the right basal ganglion.  2. Underlying paranasal sinus disease.  3. No acute intracranial pathology    Electronically signed by:  Paxton Dove MD  3/24/2023 10:57 PM CDT Workstation: [id number]                                            ASSESSMENT AND PLAN:        Multiple sclerosis      Workup  CTH:  Hypodensity in the right basal ganglia  MRI brain:  DWI hyperintensity in the right basal ganglia region however there is no ADC correlate.  Likely T2 shine through.  Extensive FLAIR hyperintensity noted in the periventricular area significantly more for patient's age.  MRI brain with contrast: Post contrasted T1-weighted images demonstrate regions of enhancement within the right basal ganglia/thalamus and extending into the right cerebral peduncle  MRA head:  Normal MR angio brain  MRI C spine: There are 2 foci of abnormal signal within the cervical spinal cord at C3 and C4. Given the findings seen within the brain, these are concerning for demyelination plaques of MS. There is no abnormal enhancement to suggest any active demyelination.         Plan  Patient presented with sudden onset of left-sided weakness for the past 2 days.  Based on the above MRI brain, C-spine findings, patient likely has demyelinating disorder (likely MS).  Patient has dissemination in space with lesions being supratentorial and infratentorial also dissemination in time with enhancing lesion in the brain and nonenhancing lesion in the spine which qualifies for multiple sclerosis.  Likely multiple sclerosis exacerbation now.  Started on methylprednisolone 500 mg b.i.d. for 3 days for management of multiple sclerosis exacerbation (day 2/3)  Started on high dose vitamin D supplementation  Can hold off on aspirin given concern for stroke is low and likely diagnosis multiple sclerosis  Outpatient follow-up for disease modifying agents.  Blood work with TARAH, ESR, CRP, Anca  panel, vitamin B12, vitamin-D, folate, RPR.  PT OT  Speech therapy  DVT prophylaxis with chemo/SCD prophylaxis  Discussed lifestyle modifications as prophylactic measures for stroke prevention including smoking cessation, adequate blood pressure management, healthy diet and regular exercise.       DVT prophylaxis with chemo/SCD prophylaxis      Patient to follow up with [facility] at [phone] within 3 days from discharge.     Stroke education was provided including stroke risk factors modification and any acute neurological changes including weakness, confusion, visual changes to come straight to the ER.     All questions were answered.                              Thank you kindly for including us in the care of this patient. Please do not hesitate to contact us with any questions.      45 minutes of care time has been spent evaluating with the patient. Time includes chart review not limited to diagnostic imaging, labs, and vitals, patient assessment, discussion with family and nursing, current order evaluations, and new order entries.      Nedra Childs MD  Neurology/vascular Neurology  Date of Service: 03/27/2023

## 2023-03-27 NOTE — PROGRESS NOTES
[facility] Medicine  Progress Note    Patient Name: Brennan Shah  MRN: [id number]  Patient Class: IP- Inpatient   Admission Date: 3/24/2023  Length of Stay: 3 days  Attending Physician: Lauro Heart MD  Primary Care Provider: Primary Doctor No        Subjective:     Principal Problem:Multiple sclerosis with acute neurologic event        HPI:  37-year-old without any past medical history presented with chief complaint of left-sided weakness, imbalance.  Upon further asking patient mentioned that he was in his usual state of health until 2 days ago when he woke up his left side felt weak, he also noticed his gait was not steady.  His symptoms were progressively getting worse and he decided to come to ED. otherwise denies any fever, chills, headache, dizziness, chest pain, palpitations, nausea, vomiting, bladder or bowel symptoms.  He denies any prior history of CVA, CAD, hypertension.  Patient's mother has 3 strokes however she has multiple comorbidities including hypertension.     In ED patient was found to have left-sided motor weakness, coordination deficit, gait imbalance.  NIH of at least 4.  Hospital medicine was consulted for further workup.       Overview/Hospital Course:  No notes on file    Interval History:  No acute events overnight. Feels better today. Continuing steroids. Meeting need for rehab, but his partner is due on Thursday so he will need to be availble for the delivery.     Review of Systems   All other systems reviewed and are negative.  Objective:     Vital Signs (Most Recent):  Temp: 97.8 °F (36.6 °C) (03/27/23 1148)  Pulse: 74 (03/27/23 1148)  Resp: 18 (03/27/23 1148)  BP: 118/65 (03/27/23 1148)  SpO2: 98 % (03/27/23 1148)   Vital Signs (24h Range):  Temp:  [97.3 °F (36.3 °C)-98.1 °F (36.7 °C)] 97.8 °F (36.6 °C)  Pulse:  [] 74  Resp:  [17-18] 18  SpO2:  [96 %-99 %] 98 %  BP: ()/(63-72) 118/65     Weight: 75 kg (165 lb 5.5 oz)  Body mass index is 21.82  kg/m².    Intake/Output Summary (Last 24 hours) at 3/27/2023 1205  Last data filed at 3/27/2023 0809  Gross per 24 hour   Intake 120 ml   Output --   Net 120 ml        Physical Exam  Vitals reviewed.   Constitutional:       Appearance: Normal appearance.   HENT:      Head: Normocephalic and atraumatic.      Nose: Nose normal.      Mouth/Throat:      Mouth: Mucous membranes are moist.   Eyes:      Pupils: Pupils are equal, round, and reactive to light.   Cardiovascular:      Rate and Rhythm: Normal rate and regular rhythm.      Pulses: Normal pulses.      Heart sounds: Normal heart sounds. No murmur heard.    No friction rub. No gallop.   Pulmonary:      Effort: Pulmonary effort is normal. No respiratory distress.      Breath sounds: Normal breath sounds.   Abdominal:      General: Abdomen is flat. Bowel sounds are normal. There is no distension.      Palpations: Abdomen is soft.      Tenderness: There is no abdominal tenderness.   Musculoskeletal:         General: No swelling. Normal range of motion.      Cervical back: Normal range of motion and neck supple.   Skin:     General: Skin is warm and dry.   Neurological:      Mental Status: He is alert and oriented to person, place, and time.      Comments: Mild residual left-sided weakness   Psychiatric:         Mood and Affect: Mood normal.         Behavior: Behavior normal.         Thought Content: Thought content normal.         Judgment: Judgment normal.       Significant Labs: All pertinent labs within the past 24 hours have been reviewed.    Significant Imaging: I have reviewed all pertinent imaging results/findings within the past 24 hours.      Assessment/Plan:      * Multiple sclerosis with acute neurologic event  - Pt appears to have MS based on neurological imaging findings and symptoms  - continue steroids IV per Dr Courtney will complete tomorrow.   - d/c ASA  - Appreciate Dr Courtney's recommendations  - MS panel sent  - will need outpatient follow up with  neurology closely for managment      Marijuana smoker        Left-sided Hemiparesis  Due to MS lesions most likely  PT and OT working with him  May benefit from rehab, but has a child due on Thursday so may limit his ability to participate in rehab        VTE Risk Mitigation (From admission, onward)         Ordered     IP VTE LOW RISK PATIENT  Once         03/24/23 2325     Place sequential compression device  Until discontinued         03/24/23 2325                Discharge Planning   ASHER:      Code Status: Full Code   Is the patient medically ready for discharge?:     Reason for patient still in hospital (select all that apply): Treatment  Discharge Plan A: Rehab   Discharge Delays: None known at this time              Lauro Heart MD  Department of Hospital Medicine   [facility]

## 2023-03-27 NOTE — ASSESSMENT & PLAN NOTE
- Pt appears to have MS based on neurological imaging findings and symptoms  - continue steroids IV per Dr Courtney will complete tomorrow.   - d/c ASA  - Appreciate Dr Courtney's recommendations  - MS panel sent  - will need outpatient follow up with neurology closely for managment

## 2023-03-27 NOTE — PT/OT/SLP PROGRESS
Occupational Therapy   Treatment    Name: Brennan Shah  MRN: [id number]  Admitting Diagnosis:  Multiple sclerosis with acute neurologic event       Recommendations:     Discharge Recommendations: rehabilitation facility, outpatient OT  Discharge Equipment Recommendations:  none  Barriers to discharge:       Assessment:     Brennan Shah is a 37 y.o. male with a medical diagnosis of Multiple sclerosis with acute neurologic event.  He presents with improving medical acuity and functional mobility. Patient participated in LB dressing sitting EOB, grooming standing at sink, toilet transfer and ambulation using no AD. Performance deficits affecting function are weakness, impaired endurance, impaired self care skills, impaired functional mobility, impaired sensation, gait instability, impaired balance, decreased lower extremity function, decreased upper extremity function, decreased safety awareness.     Rehab Prognosis:  Good; patient would benefit from acute skilled OT services to address these deficits and reach maximum level of function.       Plan:     Patient to be seen 6 x/week to address the above listed problems via self-care/home management, therapeutic activities, therapeutic exercises  Plan of Care Expires: 04/24/23  Plan of Care Reviewed with: patient    Subjective     Chief Complaint: LUE and LLE weakness.  Patient/Family Comments/goals: improved functional mobility, ADLs and Work hardening.  Pain/Comfort:  Pain Rating 1: 0/10  Pain Rating Post-Intervention 1: 0/10    Objective:     Communicated with: nurse prior to session.  Patient found sitting edge of bed with telemetry, peripheral IV upon OT entry to room.    General Precautions: Standard, fall    Orthopedic Precautions:N/A  Braces: N/A  Respiratory Status: Room air     Occupational Performance:     Functional Mobility/Transfers:  Patient completed Sit <> Stand Transfer with contact guard assistance  with  no assistive device   Patient completed Toilet  Transfer Step Transfer technique with contact guard assistance with  no AD  Functional Mobility: ambulated 25 feet in the hospital room and bathroom with contact guard assistance using no AD.    Activities of Daily Living:  Grooming: contact guard assistance to wash hands standing at sink.  Lower Body Dressing: stand by assistance to don/doff socks sitting EOB.      [facility] 6 Click ADL:      Treatment & Education:  Patient had a few losses of balance while standing at sink and ambulating in the hospital room.    Patient left sitting edge of bed with all lines intact and call button in reach    GOALS:   Multidisciplinary Problems       Occupational Therapy Goals          Problem: Occupational Therapy    Goal Priority Disciplines Outcome Interventions   Occupational Therapy Goal     OT, PT/OT     Description: Goals to be met by: 4/24/23     Patient will increase functional independence with ADLs by performing:    UE Dressing with Modified Ma[place]ick.  LE Dressing with Modified Maverick.  Grooming while standing at sink with Modified Maverick.  Toileting from toilet with Modified Maverick for hygiene and clothing management.   Toilet transfer to toilet with Modified Maverick.  Upper extremity exercise program x10 reps per handout, with independence.                         Time Tracking:     OT Date of Treatment: 03/27/23  OT Start Time: 1002  OT Stop Time: 1011  OT Total Time (min): 9 min    Billable Minutes:Self Care/Home Management 9    OT/GENI: OT          3/27/2023

## 2023-03-28 VITALS
HEART RATE: 84 BPM | RESPIRATION RATE: 17 BRPM | BODY MASS INDEX: 20.58 KG/M2 | TEMPERATURE: 98 F | WEIGHT: 155.31 LBS | SYSTOLIC BLOOD PRESSURE: 119 MMHG | HEIGHT: 73 IN | OXYGEN SATURATION: 99 % | DIASTOLIC BLOOD PRESSURE: 61 MMHG

## 2023-03-28 LAB
HBV SURFACE AB SER QL: NON REACTIVE
HBV SURFACE AG SERPL QL IA: NEGATIVE
HCV AB S/CO SERPL IA: NON REACTIVE

## 2023-03-28 PROCEDURE — 97535 SELF CARE MNGMENT TRAINING: CPT

## 2023-03-28 PROCEDURE — 63600175 PHARM REV CODE 636 W HCPCS: Performed by: INTERNAL MEDICINE

## 2023-03-28 PROCEDURE — 25000003 PHARM REV CODE 250: Performed by: INTERNAL MEDICINE

## 2023-03-28 PROCEDURE — 25000003 PHARM REV CODE 250: Performed by: STUDENT IN AN ORGANIZED HEALTH CARE EDUCATION/TRAINING PROGRAM

## 2023-03-28 PROCEDURE — 97116 GAIT TRAINING THERAPY: CPT

## 2023-03-28 RX ORDER — ATORVASTATIN CALCIUM 40 MG/1
40 TABLET, FILM COATED ORAL NIGHTLY
Qty: 90 TABLET | Refills: 0 | Status: SHIPPED | OUTPATIENT
Start: 2023-03-28 | End: 2024-03-27

## 2023-03-28 RX ORDER — CHOLECALCIFEROL (VITAMIN D3) 50 MCG
4000 TABLET ORAL DAILY
Qty: 180 TABLET | Refills: 0 | Status: SHIPPED | OUTPATIENT
Start: 2023-03-29 | End: 2023-06-27

## 2023-03-28 RX ADMIN — METHYLPREDNISOLONE SODIUM SUCCINATE 500 MG: 500 INJECTION INTRAMUSCULAR; INTRAVENOUS at 02:03

## 2023-03-28 RX ADMIN — Medication 4000 UNITS: at 09:03

## 2023-03-28 RX ADMIN — METHYLPREDNISOLONE SODIUM SUCCINATE 500 MG: 500 INJECTION INTRAMUSCULAR; INTRAVENOUS at 08:03

## 2023-03-28 RX ADMIN — PANTOPRAZOLE SODIUM 40 MG: 40 TABLET, DELAYED RELEASE ORAL at 09:03

## 2023-03-28 NOTE — PLAN OF CARE
Social Work intern sent referral out to [facility] for outpatient Rehab. (Fax#: [phone]) Waiting for them to follow up.

## 2023-03-28 NOTE — PROGRESS NOTES
Reassessment completed at bedside. Pt verbalized a want for outpatient rehab. Pt confirmed reliable transportation and safe living environment.       03/28/23 1138   Discharge Reassessment   Assessment Type Discharge Planning Reassessment   Did the patient's condition or plan change since previous assessment? No   Discharge Plan discussed with: Patient   Communicated ASHER with patient/caregiver Date not available/Unable to determine   Discharge Plan A Home   Discharge Plan B Rehab  (Outpatient)   DME Needed Upon Discharge  walker, rolling   Discharge Barriers Identified None   Why the patient remains in the hospital Requires continued medical care   Post-Acute Status   Coverage [employer] Blue shield   Discharge Delays None known at this time

## 2023-03-28 NOTE — PT/OT/SLP PROGRESS
Physical Therapy Treatment    Patient Name:  Brennan Shah   MRN:  [id number]    Recommendations:     Discharge Recommendations: outpatient PT  Discharge Equipment Recommendations: walker, rolling  Barriers to discharge: None    Assessment:     Brennan Shah is a 37 y.o. male admitted with a medical diagnosis of Multiple sclerosis with acute neurologic event.  He presents with the following impairments/functional limitations: weakness, gait instability, impaired balance, decreased lower extremity function, decreased upper extremity function .    Rehab Prognosis: Good; patient would benefit from acute skilled PT services to address these deficits and reach maximum level of function.    Recent Surgery: * No surgery found *      Plan:     During this hospitalization, patient to be seen daily to address the identified rehab impairments via gait training, therapeutic activities, neuromuscular re-education and progress toward the following goals:    Plan of Care Expires:  04/25/23    Subjective     Chief Complaint: heaviness sensation of L LE  Patient/Family Comments/goals: OP PT  Pain/Comfort:         Objective:     Communicated with nurse prior to session.  Patient found sitting edge of bed with telemetry upon PT entry to room.     General Precautions: Standard, fall  Orthopedic Precautions: N/A  Braces: N/A  Respiratory Status: Room air     Functional Mobility:  Transfers:     Sit to Stand:  stand by assistance with no AD  Gait: 150 ft CGA no  ft RW and SBA . Pt has R hip drop.      AM-PAC 6 CLICK MOBILITY          Treatment & Education:  Gait training with and w/o AD.    Patient left sitting edge of bed with all lines intact and his mate present..    GOALS:   Multidisciplinary Problems       Physical Therapy Goals          Problem: Physical Therapy    Goal Priority Disciplines Outcome Goal Variances Interventions   Physical Therapy Goal     PT, PT/OT Ongoing, Progressing     Description: Goals to be met by:  23     Patient will increase functional independence with mobility by performin. Supine to sit with Sully  2. Sit to stand transfer with Sully  3. Bed to chair transfer with Sully using No Assistive Device  4. Gait  x 500 feet with Sully using No Assistive Device.   5. Lower extremity exercise program x20 reps per handout, with independence                         Time Tracking:     PT Received On: 23  PT Start Time: 1040     PT Stop Time: 1050  PT Total Time (min): 10 min     Billable Minutes: Gait Training 10 minutes    Treatment Type: Treatment  PT/PTA: PT     Number of PTA visits since last PT visit: 0     2023

## 2023-03-28 NOTE — PROGRESS NOTES
Social Work intern spoke to the  with [facility]. Pt has been accepted with this facility.  will contact Pt to set up appointments. Patient is Clear for discharge from a case management stand point.        03/28/23 1554   Final Note   Assessment Type Discharge Planning Assessment   Anticipated Discharge Disposition Home  (Outpaitent physical therapy)   What phone number can be called within the next 1-3 days to see how you are doing after discharge? [phone]   Hospital Resources/Appts/Education Provided Provided patient/caregiver with written discharge plan information;Appointments scheduled and added to AVS;Appointments scheduled by Navigator/Coordinator   Post-Acute Status   Coverage [employer] Blue Shield   Discharge Delays None known at this time

## 2023-03-28 NOTE — PT/OT/SLP PROGRESS
Occupational Therapy   Treatment    Name: Brennan Shah  MRN: [id number]  Admitting Diagnosis:  Multiple sclerosis with acute neurologic event       Recommendations:     Discharge Recommendations: rehabilitation facility, outpatient OT  Discharge Equipment Recommendations:  none  Barriers to discharge:       Assessment:     Brennan Shah is a 37 y.o. male with a medical diagnosis of Multiple sclerosis with acute neurologic event.  He presents with right basal ganglia MS lesion. Presents with left UE/LE numbness and weakness. Patient participated in LB dressing sitting EOB, grooming standing at sink and ambulation in the hospital room. Performance deficits affecting function are weakness, impaired endurance, impaired sensation, impaired self care skills, impaired functional mobility, gait instability, impaired balance, decreased upper extremity function, decreased lower extremity function, decreased safety awareness.     Rehab Prognosis:  Good; patient would benefit from acute skilled OT services to address these deficits and reach maximum level of function.       Plan:     Patient to be seen 6 x/week to address the above listed problems via self-care/home management, therapeutic activities, therapeutic exercises  Plan of Care Expires: 04/24/23  Plan of Care Reviewed with: patient    Subjective     Chief Complaint: left UE/LE numbness and weakness  Patient/Family Comments/goals: improved LUE strength and endurance to perform ADLs, IADLs and work hardening.  Pain/Comfort:  Pain Rating 1: 0/10  Pain Rating Post-Intervention 1: 0/10    Objective:     Communicated with: nurse prior to session.  Patient found sitting edge of bed with telemetry, peripheral IV upon OT entry to room.    General Precautions: Standard, fall    Orthopedic Precautions:N/A  Braces: N/A  Respiratory Status: Room air     Occupational Performance:     Functional Mobility/Transfers:  Patient completed Sit <> Stand Transfer with stand by assistance   with  no assistive device   Functional Mobility: ambulated 25 feet in the hospital room and bathroom with stand by assistance using no AD.    Activities of Daily Living:  Grooming: stand by assistance to wash hands and face standing at sink.  Lower Body Dressing: stand by assistance to don/doff socks sitting EOB.      [facility] 6 Click ADL: 19    Treatment & Education:  Patient reports ongoing numbness to left LE and UE.     Patient left sitting edge of bed with all lines intact and call button in reach    GOALS:   Multidisciplinary Problems       Occupational Therapy Goals          Problem: Occupational Therapy    Goal Priority Disciplines Outcome Interventions   Occupational Therapy Goal     OT, PT/OT Ongoing, Progressing    Description: Goals to be met by: 4/24/23     Patient will increase functional independence with ADLs by performing:    UE Dressing with Modified [place].  LE Dressing with Modified [place].  Grooming while standing at sink with Modified [place].  Toileting from toilet with Modified [place] for hygiene and clothing management.   Toilet transfer to toilet with Modified [place].  Upper extremity exercise program x10 reps per handout, with independence.                         Time Tracking:     OT Date of Treatment: 03/28/23  OT Start Time: 1010  OT Stop Time: 1021  OT Total Time (min): 11 min    Billable Minutes:Self Care/Home Management 11    OT/GENI: OT          3/28/2023

## 2023-03-28 NOTE — PLAN OF CARE
Problem: Occupational Therapy  Goal: Occupational Therapy Goal  Description: Goals to be met by: 4/24/23     Patient will increase functional independence with ADLs by performing:    UE Dressing with Modified [place].  LE Dressing with Modified [place].  Grooming while standing at sink with Modified [place].  Toileting from toilet with Modified [place] for hygiene and clothing management.   Toilet transfer to toilet with Modified Isabella.  Upper extremity exercise program x10 reps per handout, with independence.    Outcome: Ongoing, Progressing

## 2023-03-28 NOTE — PROGRESS NOTES
[facility]  Department of Neurology  Progress Note  Date: 2023           Patient Name: Brennan Shah   MRN: [id number]   : 1985    AGE: 37 y.o.  LOS: 4 days Hospital Day: 5  Admit date: 3/24/2023  8:23 PM        HPI per EMR: Brennan Shah is a 37 y.o. male without any past medical history presented with chief complaint of left-sided weakness, imbalance.  Upon further asking patient mentioned that he was in his usual state of health until 2 days ago when he woke up his left side felt weak, he also noticed his gait was not steady.  His symptoms were progressively getting worse and he decided to come to ED. otherwise denies any fever, chills, headache, dizziness, chest pain, palpitations, nausea, vomiting, bladder or bowel symptoms.  He denies any prior history of CVA, CAD, hypertension.  Patient's mother has 3 strokes however she has multiple comorbidities including hypertension.      Neurology consult:  Patient was seen examined by me this morning.  He is alert, oriented x3.  He states that the past 2 days, he has had left upper and lower extremity weakness and difficulty walking due to weakness in the left lower extremity.  He denies any slurred speech, vision trouble, nausea vomiting, sensory changes.  He smokes about 1 pack of cigarettes every 2-3 days occasionally drinks alcohol.  He has a family history of stroke his aunt who had her 1st stroke at age of 46.    2023: No acute events overnight. Patient was seen and examined by me this morning. Neuro exam with mild left upper and lower extremity weakness otherwise normal.  MRI brain with contrast enhancement of the right basal ganglia lesion and also noted lesion in the MRI C-spine    23: Patient was seen and examined by me today. He reports improvement of his weakness with steroids, and denies any new neuro deficits. Has been walking in the hallway several times a day.    3/28/23: Patient seen and examined this afternoon. He  reports continued improvement of his weakness. He is up ambulating in room, denies new neuro deficits. Family in room.        Vitals:  Patient Vitals for the past 24 hrs:   BP Temp Temp src Pulse Resp SpO2 Weight   03/28/23 0728 100/65 97.9 °F (36.6 °C) Oral 62 17 -- --   03/28/23 0418 (!) 100/54 98.5 °F (36.9 °C) Oral 68 18 97 % 70.5 kg (155 lb 5 oz)   03/27/23 2355 (!) 116/42 98.6 °F (37 °C) Oral 75 18 98 % --   03/27/23 1943 (!) 113/58 98.2 °F (36.8 °C) Oral 81 18 100 % --   03/27/23 1604 121/75 97.9 °F (36.6 °C) Oral 90 18 97 % --   03/27/23 1148 118/65 97.8 °F (36.6 °C) Oral 74 18 98 % --     PHYSICAL EXAM:     GENERAL APPEARANCE: Well-developed, well-nourished male in no acute distress.  HEENT: Normocephalic and atraumatic. PERRL. Oropharynx unremarkable.  PULM: Comfortable on room air.  CV: RRR.  ABDOMEN: Soft, nontender.  EXTREMITIES: No signs of vascular compromise. Pulses present. No cyanosis, clubbing or edema.  SKIN: Clear; no rashes, lesions or skin breaks in exposed areas.      NEURO:   MENTAL STATUS: Patient awake and oriented to time, place, and person. Affect normal.  CRANIAL NERVES II-XII: Pupils equal, round and reactive to light. Extraocular movements full and intact. No facial asymmetry.  MOTOR: Normal bulk. Tone normal and symmetrical throughout.  No abnormal movements. No tremor.   Strength 5/5 right upper and lower extremity, 4+/5 left upper and lower extremity.   REFLEXES: DTRs 2+; normal and symmetric throughout.   SENSATION: Sensation grossly intact to fine touch.  COORDINATION: Finger-to-nose normal for age and symmetric.  STATION: Romberg deferred.  GAIT: Normal.    CURRENT SCHEDULED MEDICATIONS:   atorvastatin  40 mg Oral QHS    cholecalciferol (vitamin D3)  4,000 Units Oral Daily    methylPREDNISolone sodium succinate injection  500 mg Intravenous Q12H    pantoprazole  40 mg Oral Daily     CURRENT INFUSIONS:    DATA:  Recent Labs   Lab 03/24/23  2128 03/25/23  0452 03/26/23  0433  03/26/23  1434    137 140  --    K 4.0 3.9 3.6  --     105 105  --    CO2 29 26 26  --    BUN 9 12 7  --    CREATININE 1.1 0.9 0.8  --     108 102  --    CALCIUM 9.5 9.1 9.4  --    PHOS  --  3.8  --   --    MG  --  1.8  --   --    AST 19 19 19 22   ALT 13 15 17 16     Recent Labs   Lab 03/24/23 2128 03/25/23 0452 03/26/23  0433   WBC 6.64 7.12 8.24   HGB 15.2 14.8 15.6   HCT 47.1 44.6 46.4    186 202     No results found for: PROTEINCSF, GLUCCSF, WBCCSF, RBCCSF, PMNCSF  Hemoglobin A1C   Date Value Ref Range Status   03/25/2023 5.8 4.5 - 6.2 % Final     Comment:     According to ADA guidelines, hemoglobin A1C <7.0% represents  optimal control in non-pregnant diabetic patients.  Different  metrics may apply to specific populations.   Standards of Medical Care in Diabetes - 2016.    For the purpose of screening for the presence of diabetes:  <5.7%     Consistent with the absence of diabetes  5.7-6.4%  Consistent with increasing risk for diabetes   (prediabetes)  >or=6.5%  Consistent with diabetes    Currently no consensus exists for use of hemoglobin A1C  for diagnosis of diabetes for children.              I have personally reviewed and interpreted the pertinent imaging and lab results.  Imaging Results              US Carotid Bilateral (Final result)  Result time 03/25/23 04:12:52      Final result by Paxton Jones DO (03/25/23 04:12:52)                   Narrative:    CLINICAL HISTORY:  CVA    EXAMINATION:  US CAROTID BILATERAL    COMPARISON:  No comparison.    FINDINGS:  Peak systolic velocities are listed below (all values in cm/s).    RIGHT  - CCA PROX PSV     -  - CCA MID PSV         103  - CCA DIST PSV       -  - ICA P PSV              55  - ICA M PSV             86  - ICA D PSV              73  - ECA                        93  - VERT                      71  antegrade.  - ICA/CCA                 0.8    LEFT  - CCA PROX PSV     -  - CCA MID PS           115  - CCA DIST PSV        -  - ICA P PSV               88  - ICA M PSV               77  - ICA D PSV               68  - ECA                         38  - VERT                       70  antegrade.  - ICA/CCA                  0.8      CAROTID STENOSIS REFERENCE USING SRU CRITERIA:  Mild - <50% stenosis. ICA PSV is less than 125 cm/second and plaque or intimal thickening is visible.    Moderate - 50-69% stenosis. ICA PSV is 125 to 230 cm/second and plaque is visible.    Severe - 70-94% stenosis. ICA PSV is more than 230 cm/second and visible plaque with lumen narrowing is seen.    Near occlusion - 95-99% stenosis. ICA PSV is variable and significant plaque with luminal narrowing is seen.    Occluded - 100% stenosis. No flow identified.    IMPRESSION:  There is no significant degree of atherosclerotic change or stenosis identified within the bilateral common carotid or bilateral internal carotid arteries.    Electronically signed by:  Paxton Jones DO  3/25/2023 4:12 AM CDT Workstation: [id number]                                     CT Head Without Contrast (Final result)  Result time 03/24/23 22:57:01      Final result by Paxton Dove MD (03/24/23 22:57:01)                   Narrative:    Head CT scan without contrast    COMPARISONS: None    ADDITIONAL PERTINENT HISTORY: Acute stroke    TECHNIQUE:  Multiple axial images were obtained from the skull base to the vertex without IV contrast. One of the following dose optimization techniques was utilized in the performance of this exam: Automated exposure control; adjustment of the mA and/or kV according to the patient's size; or use of an iterative  reconstruction technique.  Specific details can be referenced in the facility's radiology CT exam operational policy.    FINDINGS:    Midline shift: Negative    Ventricles:  Negative    Brain parenchyma:  Small lacunar infarct in the right basal ganglia.    Extra-axial spaces:  Negative    Intracranial vasculature:  Negative    Osseous structures:   Negative    Paranasal sinuses and mastoid air cells:  Mild mucosal thickening involving the maxillary sinuses and ethmoid air cells    Surrounding soft tissues and orbits:  Negative      IMPRESSION:  1. Small lacunar infarct involving the right basal ganglion.  2. Underlying paranasal sinus disease.  3. No acute intracranial pathology    Electronically signed by:  Paxton Dove MD  3/24/2023 10:57 PM CDT Workstation: [id number]                                            ASSESSMENT AND PLAN:        Multiple sclerosis      Workup  CTH:  Hypodensity in the right basal ganglia  MRI brain:  DWI hyperintensity in the right basal ganglia region however there is no ADC correlate.  Likely T2 shine through.  Extensive FLAIR hyperintensity noted in the periventricular area significantly more for patient's age.  MRI brain with contrast: Post contrasted T1-weighted images demonstrate regions of enhancement within the right basal ganglia/thalamus and extending into the right cerebral peduncle  MRA head:  Normal MR angio brain  MRI C spine: There are 2 foci of abnormal signal within the cervical spinal cord at C3 and C4. Given the findings seen within the brain, these are concerning for demyelination plaques of MS. There is no abnormal enhancement to suggest any active demyelination.         Plan  Patient presented with sudden onset of left-sided weakness for the past 2 days.  Based on the above MRI brain, C-spine findings, patient likely has demyelinating disorder (likely MS).  Patient has dissemination in space with lesions being supratentorial and infratentorial also dissemination in time with enhancing lesion in the brain and nonenhancing lesion in the spine which qualifies for multiple sclerosis.  Likely multiple sclerosis exacerbation now.  Started on methylprednisolone 500 mg b.i.d. for 3 days for management of multiple sclerosis exacerbation (day 3/3)  Started on high dose vitamin D supplementation  Can hold off on aspirin  given concern for stroke is low and likely diagnosis multiple sclerosis  Outpatient follow-up for disease modifying agents.  Blood work with TARAH, ESR, CRP, Anca panel, vitamin B12, vitamin-D, folate, RPR.  PT OT  Speech therapy  DVT prophylaxis with chemo/SCD prophylaxis  Discussed lifestyle modifications as prophylactic measures for stroke prevention including smoking cessation, adequate blood pressure management, healthy diet and regular exercise.       DVT prophylaxis with chemo/SCD prophylaxis      [phone] 3 days from discharge.     Stroke education was provided including stroke risk factors modification and any acute neurological changes including weakness, confusion, visual changes to come straight to the ER.     All questions were answered.                              Thank you kindly for including us in the care of this patient. Please do not hesitate to contact us with any questions.      45 minutes of care time has been spent evaluating with the patient. Time includes chart review not limited to diagnostic imaging, labs, and vitals, patient assessment, discussion with family and nursing, current order evaluations, and new order entries.      Ysabel Morrison DNP  Neurology/vascular Neurology  Date of Service: 03/28/2023

## 2023-03-28 NOTE — NURSING
Discharge instructions given to patient. Patient verbalized understanding of follow up appointments and medications. PIV removed without difficulty, catheter tip intact. Tele removed. Patient discharged home via private vehicle.

## 2023-03-29 LAB
ANA TITR SER IF: NEGATIVE {TITER}
C-ANCA TITR SER IF: NORMAL TITER
COPPER SERPL-MCNC: 127 UG/DL (ref 69–132)
HIV 1+2 AB+HIV1 P24 AG SERPL QL IA: NON REACTIVE
MYELOPEROXIDASE AB: <0.2 UNITS (ref 0–0.9)
P-ANCA ATYPICAL TITR SER IF: NORMAL TITER
P-ANCA TITR SER IF: NORMAL TITER
PROTEINASE3 AB SER IA-ACNC: <0.2 UNITS (ref 0–0.9)

## 2023-03-29 NOTE — DISCHARGE SUMMARY
[facility]  Discharge Summary      Patient Name: Brennan Shah  MRN: [id number]  RACHEL: [id number]  Patient Class: IP- Inpatient  Admission Date: 3/24/2023  Hospital Length of Stay: 4 days  Discharge Date and Time:  03/29/2023 3:18 PM  Attending Physician: Kenna att. providers found   Discharging Provider: Lauro Heart MD  Primary Care Provider: Primary Doctor Kenna    Primary Care Team: Networked reference to record PCT     HPI:   37-year-old without any past medical history presented with chief complaint of left-sided weakness, imbalance.  Upon further asking patient mentioned that he was in his usual state of health until 2 days ago when he woke up his left side felt weak, he also noticed his gait was not steady.  His symptoms were progressively getting worse and he decided to come to ED. otherwise denies any fever, chills, headache, dizziness, chest pain, palpitations, nausea, vomiting, bladder or bowel symptoms.  He denies any prior history of CVA, CAD, hypertension.  Patient's mother has 3 strokes however she has multiple comorbidities including hypertension.     In ED patient was found to have left-sided motor weakness, coordination deficit, gait imbalance.  NIH of at least 4.  Hospital medicine was consulted for further workup.       * No surgery found *      Hospital Course:   Patient is a 37-year-old gentleman with no significant past medical history.  He presented with left-sided weakness and was found to have what appeared to be a stroke on CT imaging in his right basal ganglia.  Further evaluation with MRI was not consistent with CVA.  Neurology was consulted and repeated MRI which revealed evidence of multiple sclerosis with patchy lesions of demyelination in the brain as well as in the cervical spine.  Full multiple sclerosis panel of blood work was sent.  The patient was started on IV steroids for treatment of multiple sclerosis flare which he received for 3 days.  He  had significant improvement in his symptoms.  Physical therapy evaluated him and recommended rehab however the patient has a child due to be born later this week and was unable to go to rehab.  He preferred to go home and follow-up with physical therapy as an outpatient.  He will follow-up closely with Neurology for ongoing outpatient management of his new diagnosis of multiple sclerosis.  He will continue vitamin-D at the recommendation of Neurology.  He will also continue statin given elevated lipids on lipid panel.  He was very encouraged and feeling much better on the day of discharge.  He will follow up closely with Neurology for ongoing outpatient management and further workup of his multiple sclerosis diagnosis.       Goals of Care Treatment Preferences:  Code Status: Full Code      Consults:   Consults (From admission, onward)        Status Ordering Provider     Inpatient consult to   Once        Provider:  (Not yet assigned)    Completed RIVAS NICHOLS consult to case management/social work  Once        Provider:  (Not yet assigned)    Completed SHAWANDA RICO     Inpatient consult to Neurology  Once        Provider:  Alexi Courtney MD    Completed SHAWANDA RICO          No new Assessment & Plan notes have been filed under this hospital service since the last note was generated.  Service: Hospital Medicine    Final Active Diagnoses:    Diagnosis Date Noted POA    PRINCIPAL PROBLEM:  Multiple sclerosis with acute neurologic event [G35] 03/26/2023 Yes    Left-sided Hemiparesis [G81.90] 03/24/2023 Yes    Marijuana smoker [F12.90] 03/24/2023 Yes      Problems Resolved During this Admission:    Diagnosis Date Noted Date Resolved POA    Right basal ganglia stroke-out of tPA window [I63.9] 03/24/2023 03/26/2023 Yes       Discharged Condition: good    Disposition: Home or Self Care    Follow Up:   Follow-up Information     Nedra Childs MD. Schedule an appointment as  soon as possible for a visit in 1 week(s).    Specialty: Neurology  Contact information:  [address]  [place] [address]  [phone]             Primary Doctor No Follow up in 7 day(s).    Why: Follow Up with Morgan Camacho MD  at 2pm on April the 4th.  Bring a photo ID and your insurance card. Ori 10 minute early.     [address] [facility] [address] [place], [place]                     Patient Instructions:      Ambulatory referral/consult to Internal Medicine   Standing Status: Future   Referral Priority: Urgent Referral Type: Consultation   Referral Reason: Specialty Services Required   Requested Specialty: Internal Medicine   Number of Visits Requested: 1     Ambulatory referral/consult to Physical/Occupational Therapy   Standing Status: Future   Referral Priority: Routine Referral Type: Physical Medicine   Referral Reason: Specialty Services Required   Requested Specialty: Physical Therapy   Number of Visits Requested: 1     Diet Adult Regular     No dressing needed     Activity as tolerated       Significant Diagnostic Studies: Labs: BMP: No results for input(s): GLU, NA, K, CL, CO2, BUN, CREATININE, CALCIUM, MG in the last 48 hours., CBC No results for input(s): WBC, HGB, HCT, PLT in the last 48 hours. and All labs within the past 24 hours have been reviewed    Pending Diagnostic Studies:     Procedure Component Value Units Date/Time    TARAH Screen w/Reflex [[id number]] Collected: 03/26/23 1434    Order Status: Sent Lab Status: In process Updated: 03/26/23 1442    Specimen: Blood     Vitamin E [[id number]] Collected: 03/26/23 1434    Order Status: Sent Lab Status: In process Updated: 03/26/23 1442    Specimen: Blood          Medications:  Reconciled Home Medications:      Medication List      START taking these medications    atorvastatin 40 MG tablet  Commonly known as: LIPITOR  Take 1 tablet (40 mg total) by mouth every evening.     VITAMIN D3 50 mcg (2,000 unit) Tab  Generic drug: cholecalciferol (vitamin  D3)  Take 2 tablets (4,000 Units total) by mouth once daily.        STOP taking these medications    acetaminophen 500 MG tablet  Commonly known as: TYLENOL     HYDROcodone-acetaminophen 5-325 mg per tablet  Commonly known as: NORCO     LIDOcaine 4 % Ptmd     naproxen 500 MG tablet  Commonly known as: NAPROSYN            Indwelling Lines/Drains at time of discharge:   Lines/Drains/Airways     None                 Time spent on the discharge of patient: 50 minutes         Lauro Heart MD  Department of Hospital Medicine  [facility]

## 2023-03-29 NOTE — HOSPITAL COURSE
Patient is a 37-year-old gentleman with no significant past medical history.  He presented with left-sided weakness and was found to have what appeared to be a stroke on CT imaging in his right basal ganglia.  Further evaluation with MRI was not consistent with CVA.  Neurology was consulted and repeated MRI which revealed evidence of multiple sclerosis with patchy lesions of demyelination in the brain as well as in the cervical spine.  Full multiple sclerosis panel of blood work was sent.  The patient was started on IV steroids for treatment of multiple sclerosis flare which he received for 3 days.  He had significant improvement in his symptoms.  Physical therapy evaluated him and recommended rehab however the patient has a child due to be born later this week and was unable to go to rehab.  He preferred to go home and follow-up with physical therapy as an outpatient.  He will follow-up closely with Neurology for ongoing outpatient management of his new diagnosis of multiple sclerosis.  He will continue vitamin-D at the recommendation of Neurology.  He will also continue statin given elevated lipids on lipid panel.  He was very encouraged and feeling much better on the day of discharge.  He will follow up closely with Neurology for ongoing outpatient management and further workup of his multiple sclerosis diagnosis.

## 2023-03-29 NOTE — PT/OT/SLP DISCHARGE
Occupational Therapy Discharge Summary    Brennan Shah  MRN: [id number]   Principal Problem: Multiple sclerosis with acute neurologic event      Patient Discharged from acute Occupational Therapy on 3/28/2023.  Please refer to prior OT note dated 3/28/2023 for functional status.    Assessment:      Patient has not met goals.    Objective:     GOALS:   Multidisciplinary Problems       Occupational Therapy Goals       Not on file              Multidisciplinary Problems (Resolved)          Problem: Occupational Therapy    Goal Priority Disciplines Outcome Interventions   Occupational Therapy Goal   (Resolved)     OT, PT/OT Met    Description: Goals to be met by: 4/24/23     Patient will increase functional independence with ADLs by performing:    UE Dressing with Modified Carver.  LE Dressing with Modified Carver.  Grooming while standing at sink with Modified Carver.  Toileting from toilet with Modified Carver for hygiene and clothing management.   Toilet transfer to toilet with Modified Carver.  Upper extremity exercise program x10 reps per handout, with independence.                         Reasons for Discontinuation of Therapy Services  Patient d/c home.       Plan:     Patient Discharged to: Outpatient Therapy Services    3/29/2023

## 2023-03-31 NOTE — PLAN OF CARE
03/28/23 1554   Final Note   Assessment Type Final Discharge Note   Anticipated Discharge Disposition Home  (Outpaitent physical therapy)   What phone number can be called within the next 1-3 days to see how you are doing after discharge? [phone]   Hospital Resources/Appts/Education Provided Provided patient/caregiver with written discharge plan information;Appointments scheduled and added to AVS;Appointments scheduled by Navigator/Coordinator   Post-Acute Status   Coverage [employer]   Discharge Delays None known at this time

## 2023-04-02 LAB
A-TOCOPHEROL VIT E SERPL-MCNC: 5.9 MG/L (ref 5.9–19.4)
GAMMA TOCOPHEROL SERPL-MCNC: 0.5 MG/L (ref 0.7–4.9)